# Patient Record
Sex: FEMALE | Race: WHITE | Employment: UNEMPLOYED | URBAN - NONMETROPOLITAN AREA
[De-identification: names, ages, dates, MRNs, and addresses within clinical notes are randomized per-mention and may not be internally consistent; named-entity substitution may affect disease eponyms.]

---

## 2018-07-10 ENCOUNTER — OFFICE VISIT (OUTPATIENT)
Dept: RHEUMATOLOGY | Age: 53
End: 2018-07-10
Payer: COMMERCIAL

## 2018-07-10 VITALS
DIASTOLIC BLOOD PRESSURE: 104 MMHG | HEART RATE: 111 BPM | HEIGHT: 62 IN | SYSTOLIC BLOOD PRESSURE: 172 MMHG | OXYGEN SATURATION: 98 % | WEIGHT: 204.4 LBS | BODY MASS INDEX: 37.61 KG/M2

## 2018-07-10 DIAGNOSIS — Z87.39 H/O RHEUMATOID ARTHRITIS: Primary | ICD-10-CM

## 2018-07-10 DIAGNOSIS — M25.50 POLYARTHRALGIA: ICD-10-CM

## 2018-07-10 DIAGNOSIS — R68.2 DRY MOUTH: ICD-10-CM

## 2018-07-10 DIAGNOSIS — M79.7 FIBROMYALGIA: ICD-10-CM

## 2018-07-10 DIAGNOSIS — R53.83 OTHER FATIGUE: ICD-10-CM

## 2018-07-10 PROCEDURE — 99244 OFF/OP CNSLTJ NEW/EST MOD 40: CPT | Performed by: INTERNAL MEDICINE

## 2018-07-10 RX ORDER — CYCLOBENZAPRINE HCL 5 MG
5 TABLET ORAL 2 TIMES DAILY PRN
Qty: 60 TABLET | Refills: 0 | Status: SHIPPED | OUTPATIENT
Start: 2018-07-10 | End: 2018-07-20

## 2018-07-10 ASSESSMENT — ENCOUNTER SYMPTOMS
HEARTBURN: 1
CONSTIPATION: 0
ABDOMINAL PAIN: 1
EYE DISCHARGE: 0
WHEEZING: 0
BLOOD IN STOOL: 1
DOUBLE VISION: 0
DIARRHEA: 0
PHOTOPHOBIA: 0
SHORTNESS OF BREATH: 0
BLURRED VISION: 0
COUGH: 0
STRIDOR: 0
EYE REDNESS: 1

## 2018-07-10 ASSESSMENT — JOINT PAIN
TOTAL NUMBER OF SWOLLEN JOINTS: 0
TOTAL NUMBER OF TENDER JOINTS: 6

## 2018-07-10 NOTE — PROGRESS NOTES
[address] [place] [address]  Rheumatology Adult Consult/Referral Note       Date: 7/10/2018  MRN: [id number]    Rheumatoid arthritis ([place])  - Fibromyalgia, osteoporosis, GERD,   - Wt gain for 70 lbs since 2010 with increased anxiety  - Tramadol, remicaide (uknown relief) , methotrexate (last Feb 2014), prednisone    HPI:   Sarthak Farah  is a(n)52 y.o. female with a hx of Fibromyalgia, osteoporosis, GERD, Rheumatoid arthritis (? 2012) , fatty liver, dry eyes,  Hypertension,  referred by IXANG Tate - CNP  for evaluation of her rheumatoid arthritis & fibromyalgia     Symptoms started: around 2006 with Right shoulder then progressed to intermittent episode of shoulder and wrist pain. (+) RF of 60.4 and ESR 25. Diagnosed with fibromyalgia by Dr. Meseret Almodovar at [facility]. Dx'ed with  rheumatoid arthritis around 2012 by Dr. Jessica Villa and treated with methotrexate, prednisone. Remicaide started in 2017 with no significant relief. Plaquenil (auditory changes). Lost to follow up around 2017. Last  Ra flare around 2017. Increased foot pain along the bilateral feet for the past month. Nodule along the Rt forearm since March 2018. The patient reports generalized myalgia and arthralgia. Joint pain in bilatearl fingers, wrists, shoulders, hips, knees, hankles, toes, mid back and neck. Most severe pain in the feet and shoulders. Pain on a scale 0-10: ranges from 5-9/10. Type of pain: constant myalgia in the arms, legs, and buttocks. Constant, variable. localized stiffness of the shoulder, hands, feet with occasional sharp pains. The other areas are intermittent and daily  Mid-back: intermittent, localized, Timing: mornings. Aggravating factors: cold, increased use of joints. Feet/ankles: prolonged standing. Mid back: lifting. Shoulders: use of arms, lifting, folding clothing. Alleviating factors: naproxen 440mg prn (moderate relief), prednisone (helps a lot of relief), tramadol (relief) .  Current therapy: Negative for environmental allergies. Does not bruise/bleed easily. Psychiatric/Behavioral: Negative for depression, hallucinations, substance abuse and suicidal ideas. The patient is nervous/anxious and has insomnia. PAST MEDICAL HISTORY  Past Medical History:   Diagnosis Date    Fatty liver 2018    Fibromyalgia     GERD (gastroesophageal reflux disease)     Osteoporosis        SOCIAL HISTORY  Social History     Social History    Marital status:      Spouse name: N/A    Number of children: N/A    Years of education: N/A     Social History Main Topics    Smoking status: Never Smoker    Smokeless tobacco: Never Used    Alcohol use No    Drug use: No    Sexual activity: Not Asked     Other Topics Concern    None     Social History Narrative    None       FAMILY HISTORY  No family history on file. SURGICAL HISTORY  Past Surgical History:   Procedure Laterality Date    CATARACT REMOVAL  2014     SECTION  [id number],[id number],8445    CHOLECYSTECTOMY  2016    COLONOSCOPY  2014    DILATION AND CURETTAGE OF UTERUS  1981    UPPER GASTROINTESTINAL ENDOSCOPY  2016       ALLERGIES  Allergies   Allergen Reactions    Sulfa Antibiotics        CURRENT MEDICATIONS  Current Outpatient Prescriptions   Medication Sig Dispense Refill    Naproxen Sodium (ALEVE) 220 MG CAPS Take 220 mg by mouth daily as needed for Pain      cycloSPORINE (RESTASIS) 0.05 % ophthalmic emulsion 1 drop 2 times daily       No current facility-administered medications for this visit. Objective:  BP (!) 172/104 (Site: Right Arm, Position: Sitting, Cuff Size: Medium Adult)   Pulse 111   Ht 5' 2\" (1.575 m)   Wt 204 lb 6.4 oz (92.7 kg)   SpO2 98%   BMI 37.39 kg/m²     General: No distress. Alert. Eyes: PERRL. No sclera icterus. No conjunctival injection. ENT: No discharge. Pharynx clear. Neck: Trachea midline. Normal thyroid. Resp: No accessory muscle use. No crackles. No wheezing. No rhonchi.  No dullness on CHELSI; Future  - Anti SSA; Future  - Anti SSB; Future  - Uric Acid; Future  - XR CHEST STANDARD (2 VW); Future    Sicca sx's:   - reported dx of Sjogren. ? Primary or secondary  - evaluation as outlined above. Fibromaylgia:   - Fibromyalgia is a non-life threatening non-rheumatic disease and is a diagnosis of exclusion. We Discussed diagnosis, pathophysiology and management options with the patient. - I have discussed with her the importance of aerobic, non impact activity - swimming, yoga, anil chi, walking or bicycle as well as healthy diet and sleep hygiene. A graded exercise program with physical therapy  - We discussed  possible massage therapy and acupuncture. - Various FDA approved treatment such as low dose TCAs, SNRI (cymbalta, savella), gapenetoids, (neurotin, lyrica), flexeril, physical therapy with graded exercise program, cognitive behavioral thearpy. NoN-fda approved medications (muscle relaxants, ssri's) with the patient. - Use of pain medications (opiods/narcotic) can sometimes cause hyperasthesia in patients with FMS and not part of the standard treatment guidelines. - start flexeril 5mg qhs to help with sleep. Discussed side effects such as fatigue, tiredness, avoid opporating heavy machinery until she knows how the medications affects here. Health maintenance:   - denies having pneumococcal or shingles vaccination  - request records of prior DXA from Dr. Seth Bentley. Blood BM's  - pt to follow up with PCP. - checking H/H today       Return in about 2 months (around 9/10/2018). Electronically signed by Eric Bills DO on 7/10/2018 at 2:28 PM      Thank you for allowing me to participate in the care of this patient. Please call if there are any questions.

## 2018-07-10 NOTE — LETTER
[address] [facility] [address]  Phone: [phone]  Fax: [phone]    [facility], APRN *        July 10, 2018       Patient: Enrique Kinney   MR Number: [id number]   YOB: 1965   Date of Visit: 7/10/2018       Dear Dr. Salena Nguyen:    Thank you for the request for consultation for Merla Pallas to me for the evaluation of her rheumatoid arthritis and fibromyalgia. Below are the relevant portions of my assessment and plan of care. If you have questions, please do not hesitate to call me. I look forward to following Flavia along with you.     Sincerely,        Natalie COHEN, DO    CC providers:  XIANG Tena - [place] [phone]  VIA Mail

## 2018-07-23 ENCOUNTER — TELEPHONE (OUTPATIENT)
Dept: RHEUMATOLOGY | Age: 53
End: 2018-07-23

## 2018-07-23 LAB
ALBUMIN SERPL-MCNC: 3.9 G/DL
ALP BLD-CCNC: 82 U/L
ALT SERPL-CCNC: 42 U/L
ANA TITER: NORMAL
ANION GAP SERPL CALCULATED.3IONS-SCNC: 13 MMOL/L
AST SERPL-CCNC: 32 U/L
BASOPHILS ABSOLUTE: 0.01 /ΜL
BASOPHILS RELATIVE PERCENT: 0.2 %
BILIRUB SERPL-MCNC: 0.3 MG/DL (ref 0.1–1.4)
BUN BLDV-MCNC: 11 MG/DL
C-REACTIVE PROTEIN: 4.9
CALCIUM SERPL-MCNC: 9 MG/DL
CHLORIDE BLD-SCNC: 105 MMOL/L
CO2: 28 MMOL/L
CREAT SERPL-MCNC: 0.7 MG/DL
EOSINOPHILS ABSOLUTE: 0.09 /ΜL
EOSINOPHILS RELATIVE PERCENT: 1.6 %
GFR CALCULATED: NORMAL
GLUCOSE BLD-MCNC: 99 MG/DL
HCT VFR BLD CALC: 38.5 % (ref 36–46)
HEMOGLOBIN: 12.7 G/DL (ref 12–16)
LYMPHOCYTES ABSOLUTE: 1.4 /ΜL
LYMPHOCYTES RELATIVE PERCENT: 24.2 %
MCH RBC QN AUTO: 28.4 PG
MCHC RBC AUTO-ENTMCNC: 33 G/DL
MCV RBC AUTO: 86.1 FL
MONOCYTES ABSOLUTE: 0.5 /ΜL
MONOCYTES RELATIVE PERCENT: 8.7 %
NEUTROPHILS ABSOLUTE: 3.78 /ΜL
NEUTROPHILS RELATIVE PERCENT: 65.3 %
PDW BLD-RTO: 42.2 %
PLATELET # BLD: 221 K/ΜL
PMV BLD AUTO: 11.1 FL
POTASSIUM SERPL-SCNC: 4.1 MMOL/L
RBC # BLD: 4.47 10^6/ΜL
RHEUMATOID FACTOR: 108.5
SEDIMENTATION RATE, ERYTHROCYTE: 22
SODIUM BLD-SCNC: 142 MMOL/L
TOTAL PROTEIN: 7.2
TSH SERPL DL<=0.05 MIU/L-ACNC: 1.15 UIU/ML
URIC ACID: 5.6
WBC # BLD: 5.78 10^3/ML

## 2018-07-23 NOTE — TELEPHONE ENCOUNTER
Pt called but there was no answer. A message was left on the patients voicemail asking them to call back. - cbc, cmp, TSH were stable. - Mild ESR & CRP    H/o rheumatoid arthritis:   - discussed possibly purusing arava if the labs support the diagnosis. - she reported understanding and had no further questions.

## 2018-07-25 ENCOUNTER — TELEPHONE (OUTPATIENT)
Dept: RHEUMATOLOGY | Age: 53
End: 2018-07-25

## 2018-07-25 DIAGNOSIS — M05.79 RHEUMATOID ARTHRITIS INVOLVING MULTIPLE SITES WITH POSITIVE RHEUMATOID FACTOR (HCC): ICD-10-CM

## 2018-07-25 DIAGNOSIS — Z51.81 MEDICATION MONITORING ENCOUNTER: Primary | ICD-10-CM

## 2018-07-25 RX ORDER — LEFLUNOMIDE 10 MG/1
10 TABLET ORAL DAILY
Qty: 30 TABLET | Refills: 0 | Status: SHIPPED | OUTPATIENT
Start: 2018-07-25 | End: 2018-09-19 | Stop reason: SDUPTHER

## 2018-09-12 ENCOUNTER — TELEPHONE (OUTPATIENT)
Dept: RHEUMATOLOGY | Age: 53
End: 2018-09-12

## 2018-09-19 LAB
ALBUMIN SERPL-MCNC: 4 G/DL
ALP BLD-CCNC: 95 U/L
ALT SERPL-CCNC: 43 U/L
ANION GAP SERPL CALCULATED.3IONS-SCNC: 12 MMOL/L
AST SERPL-CCNC: 32 U/L
BASOPHILS ABSOLUTE: 0.01 /ΜL
BASOPHILS RELATIVE PERCENT: 0.2 %
BILIRUB SERPL-MCNC: 0.5 MG/DL (ref 0.1–1.4)
BUN BLDV-MCNC: 14.3 MG/DL
C-REACTIVE PROTEIN: 3.7
CALCIUM SERPL-MCNC: 9.3 MG/DL
CHLORIDE BLD-SCNC: 106 MMOL/L
CO2: 31 MMOL/L
CREAT SERPL-MCNC: 0.7 MG/DL
EOSINOPHILS ABSOLUTE: 0.13 /ΜL
EOSINOPHILS RELATIVE PERCENT: 2.1 %
GFR CALCULATED: NORMAL
GLUCOSE BLD-MCNC: 94 MG/DL
HCT VFR BLD CALC: 40.8 % (ref 36–46)
HEMOGLOBIN: 13.4 G/DL (ref 12–16)
LYMPHOCYTES ABSOLUTE: 1.93 /ΜL
LYMPHOCYTES RELATIVE PERCENT: 31.1 %
MCH RBC QN AUTO: 28.4 PG
MCHC RBC AUTO-ENTMCNC: 32.8 G/DL
MCV RBC AUTO: 86.4 FL
MONOCYTES ABSOLUTE: 0.61 /ΜL
MONOCYTES RELATIVE PERCENT: 9.8 %
NEUTROPHILS ABSOLUTE: 3.52 /ΜL
NEUTROPHILS RELATIVE PERCENT: 56.6 %
PDW BLD-RTO: 43.2 %
PLATELET # BLD: 224 K/ΜL
PMV BLD AUTO: 10.9 FL
POTASSIUM SERPL-SCNC: 4.5 MMOL/L
RBC # BLD: 4.72 10^6/ΜL
SEDIMENTATION RATE, ERYTHROCYTE: 20
SODIUM BLD-SCNC: 144 MMOL/L
TOTAL PROTEIN: 7.4
WBC # BLD: 6.21 10^3/ML

## 2018-09-19 RX ORDER — LEFLUNOMIDE 20 MG/1
20 TABLET ORAL DAILY
Qty: 30 TABLET | Refills: 0 | Status: SHIPPED | OUTPATIENT
Start: 2018-09-19 | End: 2018-09-25 | Stop reason: ALTCHOICE

## 2018-09-20 ENCOUNTER — TELEPHONE (OUTPATIENT)
Dept: RHEUMATOLOGY | Age: 53
End: 2018-09-20

## 2018-09-21 NOTE — TELEPHONE ENCOUNTER
Rt side pain experienced with taking of the arava. Sx's resolved that discontinuation of the [address] - decreased arava to 5mg daily to see if the patient can tolerate the lower dosing.    - if unable to tolerate the arava other tx options include Ephriam Owenll, biologics.

## 2018-09-25 ENCOUNTER — OFFICE VISIT (OUTPATIENT)
Dept: RHEUMATOLOGY | Age: 53
End: 2018-09-25
Payer: COMMERCIAL

## 2018-09-25 VITALS
DIASTOLIC BLOOD PRESSURE: 81 MMHG | HEART RATE: 59 BPM | WEIGHT: 205.8 LBS | SYSTOLIC BLOOD PRESSURE: 138 MMHG | OXYGEN SATURATION: 100 % | BODY MASS INDEX: 37.64 KG/M2

## 2018-09-25 DIAGNOSIS — R68.2 DRY MOUTH: ICD-10-CM

## 2018-09-25 DIAGNOSIS — M05.79 RHEUMATOID ARTHRITIS INVOLVING MULTIPLE SITES WITH POSITIVE RHEUMATOID FACTOR (HCC): Primary | ICD-10-CM

## 2018-09-25 DIAGNOSIS — M79.7 FIBROMYALGIA: ICD-10-CM

## 2018-09-25 DIAGNOSIS — R20.2 PARESTHESIA OF BOTH HANDS: ICD-10-CM

## 2018-09-25 DIAGNOSIS — Z51.81 MEDICATION MONITORING ENCOUNTER: ICD-10-CM

## 2018-09-25 DIAGNOSIS — Z23 ENCOUNTER FOR VACCINATION: ICD-10-CM

## 2018-09-25 PROCEDURE — 99214 OFFICE O/P EST MOD 30 MIN: CPT | Performed by: INTERNAL MEDICINE

## 2018-09-25 PROCEDURE — 90670 PCV13 VACCINE IM: CPT | Performed by: INTERNAL MEDICINE

## 2018-09-25 PROCEDURE — 90471 IMMUNIZATION ADMIN: CPT | Performed by: INTERNAL MEDICINE

## 2018-09-25 RX ORDER — AZATHIOPRINE 50 MG/1
TABLET ORAL
Qty: 60 TABLET | Refills: 0 | Status: SHIPPED | OUTPATIENT
Start: 2018-09-25 | End: 2018-10-30 | Stop reason: SDUPTHER

## 2018-09-25 RX ORDER — CYCLOBENZAPRINE HCL 10 MG
10 TABLET ORAL 3 TIMES DAILY PRN
COMMUNITY
End: 2018-12-10

## 2018-09-25 ASSESSMENT — ENCOUNTER SYMPTOMS
WHEEZING: 0
DIARRHEA: 0
HEARTBURN: 1
PHOTOPHOBIA: 0
NAUSEA: 1
STRIDOR: 0
DOUBLE VISION: 0
BLURRED VISION: 0
CONSTIPATION: 0
COUGH: 0
BLOOD IN STOOL: 1
EYE DISCHARGE: 0
ABDOMINAL PAIN: 1
EYE REDNESS: 1
SHORTNESS OF BREATH: 0

## 2018-09-25 ASSESSMENT — JOINT PAIN
TOTAL NUMBER OF TENDER JOINTS: 5
TOTAL NUMBER OF SWOLLEN JOINTS: 3

## 2018-09-25 NOTE — PROGRESS NOTES
[facility]  Rheumatology Adult Follow up Note       Date: 9/25/2018  MRN: [id number]  -   HPI:   Gloria Randle  is a(n)52 y.o. female with a hx of Fibromyalgia, osteoporosis, GERD, Rheumatoid arthritis (+, CCP 24 ? 2012) , fatty liver, dry eyes,  Hypertension for the f/u evaluation of her rheumatoid arthritis & fibromyalgia     Symptoms started: around 2006 with Right shoulder then progressed to intermittent episode of shoulder and wrist pain. (+) RF of 60.4 and ESR 25. Diagnosed with fibromyalgia by Dr. Sharon Sanchez at [facility]. Dx'ed with  rheumatoid arthritis around 2012 by Dr. Seth Bentley and treated with methotrexate, prednisone. Remicaide started in 2017 with no significant relief. Plaquenil (auditory changes). Lost to follow up around 2017. Last  Ra flare around 2017. Increased foot pain along the bilateral feet for the past month. Nodule along the Rt forearm since March 2018. Started Arava 10mg daily on 7/25/18 - stopped med b/c of Right RUQ abd pain. Off medications for ~ 1 month. Continued  generalized myalgia and arthralgia. Joint pain in bilatearl fingers, wrists, b/l shoulders, left lower back, knees,hankles, toes, mid back and neck. Most severe pain in the Right hand arms, elbow and neck. Pain: 6/10 over the past wek. Type of pain: constant myalgia in the arms, legs, and buttocks. Constant, variable. localized stiffness of the shoulder, hands,  feet with occasional sharp pains. Timing: mornings. Aggravating factors: cold, increased use of joints. Feet/ankles: prolonged standing. Mid back: lifting, prolonged flexion of arms. Shoulders: use of arms, lifting, folding clothing. Alleviating factors: naproxen 440mg prn (moderate relief), prednisone (helps a lot of relief), tramadol (relief)     AM stiffness 1-2 hours. Denies joint swelling.   (+) dry eyes, dry mouth, vaginal dryness. Occasional eye redness.    Continued white-blue-red color changes clear.  Neck: Trachea midline. Normal thyroid. Resp: No accessory muscle use. No crackles. No wheezing. No rhonchi. No dullness on percussion. CV: Regular rate. Regular rhythm. No mumur or rub. No edema. GI: Non-tender. Non-distended. No masses. No organmegaly. Normal bowel sounds. M/S:   Upper extremities:  Muscle strength 5/5, FROM, bilateral fingers, wrists, elobws, shoulders. Shoulders: bilateral adolph, speed, push off. Left hawking and scarf.   elbows- tender bilateral lateral epicondyles. Right cubital tunnel tinel. Wrists: tinel right > left bilat. Fingers. - fullness of right 3rd PIP     Lower Extremities:   Muscle strength 5/5, FROM, No Synovitis of the hips, knees, ankles, toes. Knee: non-tender, no swelling. Ankles: medial ankle tenderness bilat. No synovitis, intact ROm. Feet: left > right MTP compression testing. Spine: tenderness of the cervical and thoracic perispinal musculature tenderness, (+) periscapular tenderness and hypertonicity. 14/18 FMS tender points. Neuro: DTR's 2/4 bilat and equal  Psych: Oriented to person, place, time. No anxiety or agitation. Skin: Warm and dry. No nodule on exposed extremities. No rash on exposed extremities. Lymph: No cervical LAD.  No supraclavicular LAD.      9/25/2018  RAPID3 :   3.7+6+5 = 14.7   RAPID3 Total Score: 14    Physical Exam     Tenderness:   Right sternoclavicular and left sternoclavicular  RUE: ulnohumeral and radiohumeral  Right hand: 1st MCP, 2nd MCP, 3rd PIP and 4th PIP  RLE: tibiotalar  LLE: tibiotalar  Right foot: 1st MTP, 2nd MTP, 3rd MTP, 4th MTP and 5th MTP  Left foot: 1st MTP, 2nd MTP, 3rd MTP, 4th MTP and 5th MTP    Swelling:   Right sternoclavicular and left sternoclavicular  Right hand: 2nd MCP, 3rd PIP and 4th PIP    BROWNING-28 tender joint count: 5  BROWNING-28 swollen joint count: 3    Remission: <3  Low Disease Activity: <6  Moderate Disease Activity: >=6 and <=12  High Disease Activity: >12    LABS:  CBC  Lab upper quat abd pain)     - start imuran 50mg qd x 7 days then 50mg bid given active RA   - - We discussed risks of Azathioprine including liver infection, cancer, blood dyscrasias. she was specifically instructed to avoid ETOH use. she was instructed to stop Azathiprine at the onset of any infection and  instructed to call me if concern for infection. Sicca sx's:   - reported dx of Sjogren secondary to RA   - evaluation as outlined above. Fibromaylgia:   - Fibromyalgia is a non-life threatening non-rheumatic disease and is a diagnosis of exclusion. We Discussed diagnosis, pathophysiology and management options with the patient. - I have discussed with her the importance of aerobic, non impact activity - swimming, yoga, anil chi, walking or bicycle as well as healthy diet and sleep hygiene. A graded exercise program with physical therapy  - We discussed  possible massage therapy and acupuncture. - Various FDA approved treatment such as low dose TCAs, SNRI (cymbalta, savella), gapenetoids, (neurotin, lyrica), flexeril, physical therapy with graded exercise program, cognitive behavioral thearpy. NoN-fda approved medications (muscle relaxants, ssri's) with the patient. - Use of pain medications (opiods/narcotic) can sometimes cause hyperasthesia in patients with FMS and not part of the standard treatment guidelines. -  Decrease flexeril 2.5mg qhs to help with sleep. --     Right > left arm paresthesias:   - + tinel bilatearl wrists, and right elbow. - ? If related to RA vs compressive neuropathy  - start new medication for RA and re-evaluate at next visit. - discussed wrist splinting nightly. Health maintenance:   - denies having pneumococcal or shingles vaccination  - request records of prior DXA from Dr. Sandra Valencia. Med monitoring:   - labs (CBC, CMP, ESR, CRP)  q4 weeks given AZA initiation     Blood BM's  - pt to follow up with PCP.    - no recurrence since the last evaluation, felt likely to be from hemorrhoids. No Follow-up on file. Electronically signed by Jose Coto DO on 9/25/2018 at 8:34 AM      Thank you for allowing me to participate in the care of this patient. Please call if there are any questions.

## 2018-10-30 DIAGNOSIS — Z51.81 MEDICATION MONITORING ENCOUNTER: ICD-10-CM

## 2018-10-30 DIAGNOSIS — M05.79 RHEUMATOID ARTHRITIS INVOLVING MULTIPLE SITES WITH POSITIVE RHEUMATOID FACTOR (HCC): ICD-10-CM

## 2018-10-30 LAB
ALBUMIN SERPL-MCNC: 3.6 G/DL
ALP BLD-CCNC: 93 U/L
ALT SERPL-CCNC: 36 U/L
ANION GAP SERPL CALCULATED.3IONS-SCNC: 10 MMOL/L
AST SERPL-CCNC: 26 U/L
BASOPHILS ABSOLUTE: 0.01 /ΜL
BASOPHILS RELATIVE PERCENT: 0.2 %
BILIRUB SERPL-MCNC: 0.5 MG/DL (ref 0.1–1.4)
BUN BLDV-MCNC: 20 MG/DL
C-REACTIVE PROTEIN: 4.3
CALCIUM SERPL-MCNC: 9.2 MG/DL
CHLORIDE BLD-SCNC: 106 MMOL/L
CO2: 31 MMOL/L
CREAT SERPL-MCNC: 0.7 MG/DL
EOSINOPHILS ABSOLUTE: 0.01 /ΜL
EOSINOPHILS RELATIVE PERCENT: 2.5 %
GFR CALCULATED: 93
GLUCOSE BLD-MCNC: 92 MG/DL
HCT VFR BLD CALC: 38.9 % (ref 36–46)
HEMOGLOBIN: 12.6 G/DL (ref 12–16)
LYMPHOCYTES ABSOLUTE: 1.88 /ΜL
LYMPHOCYTES RELATIVE PERCENT: 33.2 %
MCH RBC QN AUTO: 28.2 PG
MCHC RBC AUTO-ENTMCNC: 32.4 G/DL
MCV RBC AUTO: 87 FL
MONOCYTES ABSOLUTE: 0.59 /ΜL
MONOCYTES RELATIVE PERCENT: 10.4 %
NEUTROPHILS ABSOLUTE: 3.04 /ΜL
NEUTROPHILS RELATIVE PERCENT: 53.7 %
PDW BLD-RTO: 43.8 %
PLATELET # BLD: 225 K/ΜL
PMV BLD AUTO: 10.8 FL
POTASSIUM SERPL-SCNC: 4.1 MMOL/L
RBC # BLD: 4.47 10^6/ΜL
SEDIMENTATION RATE, ERYTHROCYTE: 18
SODIUM BLD-SCNC: 143 MMOL/L
TOTAL PROTEIN: 7.2
WBC # BLD: 5.66 10^3/ML

## 2018-10-30 RX ORDER — AZATHIOPRINE 50 MG/1
50 TABLET ORAL 2 TIMES DAILY
Qty: 60 TABLET | Refills: 0 | Status: SHIPPED | OUTPATIENT
Start: 2018-10-30 | End: 2018-12-10 | Stop reason: SDUPTHER

## 2018-11-02 ENCOUNTER — TELEPHONE (OUTPATIENT)
Dept: RHEUMATOLOGY | Age: 53
End: 2018-11-02

## 2018-11-02 NOTE — TELEPHONE ENCOUNTER
Called patient, no answer. A message was left on patients voicemail asking them to give the office a call back to discuss lab results.

## 2018-11-02 NOTE — TELEPHONE ENCOUNTER
----- Message from Fatimah Goldberg DO sent at 10/30/2018  6:44 PM EDT -----  Please inform the patient that her labs are stable.   Please ask if she is tolerating the new prescription of azathioprine and if and where she is currently having joint pains

## 2018-12-07 LAB
ALBUMIN SERPL-MCNC: 3.9 G/DL
ALP BLD-CCNC: 72 U/L
ALT SERPL-CCNC: 30 U/L
ANION GAP SERPL CALCULATED.3IONS-SCNC: 14 MMOL/L
AST SERPL-CCNC: 19 U/L
BASOPHILS ABSOLUTE: 0.01 /ΜL
BASOPHILS RELATIVE PERCENT: 0.2 %
BILIRUB SERPL-MCNC: 0.4 MG/DL (ref 0.1–1.4)
BUN BLDV-MCNC: 13 MG/DL
C-REACTIVE PROTEIN: 3.9
CALCIUM SERPL-MCNC: 9.2 MG/DL
CHLORIDE BLD-SCNC: 107 MMOL/L
CO2: 26 MMOL/L
CREAT SERPL-MCNC: 0.8 MG/DL
EOSINOPHILS ABSOLUTE: 0.11 /ΜL
EOSINOPHILS RELATIVE PERCENT: 1.7 %
GFR CALCULATED: NORMAL
GLUCOSE BLD-MCNC: 95 MG/DL
HCT VFR BLD CALC: 39.3 % (ref 36–46)
HEMOGLOBIN: 12.8 G/DL (ref 12–16)
LYMPHOCYTES ABSOLUTE: 2.03 /ΜL
LYMPHOCYTES RELATIVE PERCENT: 31.9 %
MCH RBC QN AUTO: 28.3 PG
MCHC RBC AUTO-ENTMCNC: 32.6 G/DL
MCV RBC AUTO: 86.9 FL
MONOCYTES ABSOLUTE: 0.58 /ΜL
MONOCYTES RELATIVE PERCENT: 9.1 %
NEUTROPHILS ABSOLUTE: 3.63 /ΜL
NEUTROPHILS RELATIVE PERCENT: 56.9 %
PDW BLD-RTO: 43.7 %
PLATELET # BLD: 229 K/ΜL
PMV BLD AUTO: 11.2 FL
POTASSIUM SERPL-SCNC: 4.2 MMOL/L
RBC # BLD: 4.52 10^6/ΜL
SEDIMENTATION RATE, ERYTHROCYTE: 19
SODIUM BLD-SCNC: 143 MMOL/L
TOTAL PROTEIN: 7.1
WBC # BLD: 6.37 10^3/ML

## 2018-12-10 ENCOUNTER — OFFICE VISIT (OUTPATIENT)
Dept: RHEUMATOLOGY | Age: 53
End: 2018-12-10
Payer: COMMERCIAL

## 2018-12-10 VITALS
BODY MASS INDEX: 37.28 KG/M2 | SYSTOLIC BLOOD PRESSURE: 150 MMHG | OXYGEN SATURATION: 100 % | WEIGHT: 203.8 LBS | DIASTOLIC BLOOD PRESSURE: 80 MMHG | HEART RATE: 62 BPM

## 2018-12-10 DIAGNOSIS — M05.79 RHEUMATOID ARTHRITIS INVOLVING MULTIPLE SITES WITH POSITIVE RHEUMATOID FACTOR (HCC): Primary | ICD-10-CM

## 2018-12-10 DIAGNOSIS — R68.2 DRY MOUTH: ICD-10-CM

## 2018-12-10 DIAGNOSIS — M79.7 FIBROMYALGIA: ICD-10-CM

## 2018-12-10 DIAGNOSIS — Z51.81 MEDICATION MONITORING ENCOUNTER: ICD-10-CM

## 2018-12-10 DIAGNOSIS — J34.89 NASAL SORE: ICD-10-CM

## 2018-12-10 PROCEDURE — 99214 OFFICE O/P EST MOD 30 MIN: CPT | Performed by: INTERNAL MEDICINE

## 2018-12-10 RX ORDER — PREDNISONE 1 MG/1
5 TABLET ORAL DAILY
Qty: 10 TABLET | Refills: 0 | Status: SHIPPED | OUTPATIENT
Start: 2018-12-10 | End: 2018-12-20

## 2018-12-10 RX ORDER — MINOCYCLINE HYDROCHLORIDE 100 MG/1
100 TABLET ORAL 2 TIMES DAILY
Qty: 60 TABLET | Refills: 0 | Status: SHIPPED | OUTPATIENT
Start: 2018-12-10 | End: 2019-01-07 | Stop reason: SDUPTHER

## 2018-12-10 ASSESSMENT — ENCOUNTER SYMPTOMS
CONSTIPATION: 0
EYE REDNESS: 1
HEARTBURN: 1
BLURRED VISION: 0
PHOTOPHOBIA: 0
SHORTNESS OF BREATH: 0
BLOOD IN STOOL: 1
STRIDOR: 0
EYE DISCHARGE: 0
WHEEZING: 0
ABDOMINAL PAIN: 1
COUGH: 0
DIARRHEA: 0
DOUBLE VISION: 0
NAUSEA: 1

## 2018-12-10 NOTE — PROGRESS NOTES
prn (moderate relief), prednisone (helps a lot of relief), tramadol (relief)     - AM stiffness improved to 30 minutes. - Denies joint swelling, redness,warmth   - Dry eyes, dry mouth, vaginal dryness. Occasional eye redness.   - Continued white-blue-red color changes of fingers and hands with cold exposure. Denies digital ulcerations, skin tightening  - instabilty of ankles with occasional weakness sensation. Current therapy:    - naproxen. Previous therapy: prior treatment as above. Gabapentin (improved sleep), ARava 10mg (RUQ abd pain), MTX, Imuran (sept 2018-- RUQ pain)     Cancer screening: up to date   Paternal uncle : hemachromatosis. Mother: lupus/MCTD, Paternal & maternal cousin, daughter: Rheumatoid arthritis     ROS:  Review of Systems   Constitutional: Negative for malaise/fatigue and weight loss. HENT: Negative for congestion, ear discharge, hearing loss and tinnitus. Eyes: Positive for redness. Negative for blurred vision, double vision, photophobia and discharge. Respiratory: Negative for cough, shortness of breath, wheezing and stridor. Cardiovascular: Positive for leg swelling (occasional dependent). Negative for chest pain and palpitations. Gastrointestinal: Positive for abdominal pain, blood in stool, heartburn and nausea. Negative for constipation, diarrhea and melena. Genitourinary: Positive for frequency. Negative for dysuria, flank pain, hematuria and urgency. Musculoskeletal: Positive for myalgias and neck pain. Skin: Negative for rash. Neurological: Positive for dizziness, tingling (occasionally in feet, arms (bicep regions)), weakness (upper arms bilat, and decreased  strenght (knobs, jars) ) and headaches. Endo/Heme/Allergies: Negative for environmental allergies. Bruises/bleeds easily. Psychiatric/Behavioral: Negative for depression, hallucinations, substance abuse and suicidal ideas. The patient is nervous/anxious and has insomnia.         PAST

## 2019-01-04 LAB
ALBUMIN SERPL-MCNC: 3.5 G/DL
ALP BLD-CCNC: 102 U/L
ALT SERPL-CCNC: 41 U/L
ANION GAP SERPL CALCULATED.3IONS-SCNC: 15 MMOL/L
AST SERPL-CCNC: 26 U/L
BASOPHILS ABSOLUTE: 0.01 /ΜL
BASOPHILS RELATIVE PERCENT: 0.2 %
BILIRUB SERPL-MCNC: 0.4 MG/DL (ref 0.1–1.4)
BUN BLDV-MCNC: 16 MG/DL
C-REACTIVE PROTEIN: 6.6
CALCIUM SERPL-MCNC: 9.1 MG/DL
CHLORIDE BLD-SCNC: 105 MMOL/L
CO2: 25 MMOL/L
CREAT SERPL-MCNC: 0.7 MG/DL
EOSINOPHILS ABSOLUTE: 0.27 /ΜL
EOSINOPHILS RELATIVE PERCENT: 4.6 %
GFR CALCULATED: NORMAL
GLUCOSE BLD-MCNC: 96 MG/DL
HCT VFR BLD CALC: 39.8 % (ref 36–46)
HEMOGLOBIN: 12.9 G/DL (ref 12–16)
LYMPHOCYTES ABSOLUTE: 1.57 /ΜL
LYMPHOCYTES RELATIVE PERCENT: 26.8 %
MCH RBC QN AUTO: 27.9 PG
MCHC RBC AUTO-ENTMCNC: 32.4 G/DL
MCV RBC AUTO: 86.1 FL
MONOCYTES ABSOLUTE: 0.6 /ΜL
MONOCYTES RELATIVE PERCENT: 10.2 %
NEUTROPHILS ABSOLUTE: 3.39 /ΜL
NEUTROPHILS RELATIVE PERCENT: 57.9 %
PDW BLD-RTO: 42.6 %
PLATELET # BLD: 197 K/ΜL
PMV BLD AUTO: 10.8 FL
POTASSIUM SERPL-SCNC: 4.2 MMOL/L
RBC # BLD: 4.62 10^6/ΜL
SEDIMENTATION RATE, ERYTHROCYTE: 17
SODIUM BLD-SCNC: 141 MMOL/L
TOTAL PROTEIN: 7.1
WBC # BLD: 5.86 10^3/ML

## 2019-01-07 DIAGNOSIS — M05.79 RHEUMATOID ARTHRITIS INVOLVING MULTIPLE SITES WITH POSITIVE RHEUMATOID FACTOR (HCC): ICD-10-CM

## 2019-01-07 RX ORDER — MINOCYCLINE HYDROCHLORIDE 100 MG/1
100 TABLET ORAL 2 TIMES DAILY
Qty: 60 TABLET | Refills: 0 | Status: SHIPPED | OUTPATIENT
Start: 2019-01-07 | End: 2019-02-12 | Stop reason: SDUPTHER

## 2019-02-11 LAB
ALBUMIN SERPL-MCNC: 3.2 G/DL
ALP BLD-CCNC: 92 U/L
ALT SERPL-CCNC: 27 U/L
ANION GAP SERPL CALCULATED.3IONS-SCNC: 11 MMOL/L
AST SERPL-CCNC: 22 U/L
BASOPHILS ABSOLUTE: 0 /ΜL
BASOPHILS RELATIVE PERCENT: 0 %
BILIRUB SERPL-MCNC: 0.4 MG/DL (ref 0.1–1.4)
BUN BLDV-MCNC: 18 MG/DL
C-REACTIVE PROTEIN: 3.3
CALCIUM SERPL-MCNC: 9 MG/DL
CHLORIDE BLD-SCNC: 106 MMOL/L
CO2: 30 MMOL/L
CREAT SERPL-MCNC: 0.8 MG/DL
EOSINOPHILS ABSOLUTE: 0.15 /ΜL
EOSINOPHILS RELATIVE PERCENT: 2.8 %
GFR CALCULATED: 79
GLUCOSE BLD-MCNC: 97 MG/DL
HCT VFR BLD CALC: 37.2 % (ref 36–46)
HEMOGLOBIN: 11.8 G/DL (ref 12–16)
LYMPHOCYTES ABSOLUTE: 1.64 /ΜL
LYMPHOCYTES RELATIVE PERCENT: 31.1 %
MCH RBC QN AUTO: 27.8 PG
MCHC RBC AUTO-ENTMCNC: 31.7 G/DL
MCV RBC AUTO: 87.7 FL
MONOCYTES ABSOLUTE: 0.58 /ΜL
MONOCYTES RELATIVE PERCENT: 11 %
NEUTROPHILS ABSOLUTE: 2.91 /ΜL
NEUTROPHILS RELATIVE PERCENT: 55.1 %
PDW BLD-RTO: 44.6 %
PLATELET # BLD: 189 K/ΜL
PMV BLD AUTO: 11.1 FL
POTASSIUM SERPL-SCNC: 4.3 MMOL/L
RBC # BLD: 4.24 10^6/ΜL
SEDIMENTATION RATE, ERYTHROCYTE: 13
SODIUM BLD-SCNC: 143 MMOL/L
TOTAL PROTEIN: 6.5
WBC # BLD: 5.28 10^3/ML

## 2019-02-12 DIAGNOSIS — M05.79 RHEUMATOID ARTHRITIS INVOLVING MULTIPLE SITES WITH POSITIVE RHEUMATOID FACTOR (HCC): ICD-10-CM

## 2019-02-12 RX ORDER — MINOCYCLINE HYDROCHLORIDE 100 MG/1
100 TABLET ORAL 2 TIMES DAILY
Qty: 60 TABLET | Refills: 3 | Status: SHIPPED | OUTPATIENT
Start: 2019-02-12 | End: 2019-06-19 | Stop reason: SDUPTHER

## 2019-02-12 RX ORDER — PREDNISONE 1 MG/1
5 TABLET ORAL DAILY
Qty: 30 TABLET | Refills: 1 | Status: SHIPPED | OUTPATIENT
Start: 2019-02-12 | End: 2019-02-13 | Stop reason: SDUPTHER

## 2019-02-13 ENCOUNTER — OFFICE VISIT (OUTPATIENT)
Dept: RHEUMATOLOGY | Age: 54
End: 2019-02-13
Payer: COMMERCIAL

## 2019-02-13 VITALS
SYSTOLIC BLOOD PRESSURE: 126 MMHG | HEIGHT: 62 IN | BODY MASS INDEX: 37.8 KG/M2 | OXYGEN SATURATION: 99 % | WEIGHT: 205.4 LBS | DIASTOLIC BLOOD PRESSURE: 84 MMHG | HEART RATE: 73 BPM

## 2019-02-13 DIAGNOSIS — M79.7 FIBROMYALGIA: ICD-10-CM

## 2019-02-13 DIAGNOSIS — Z23 ENCOUNTER FOR VACCINATION: ICD-10-CM

## 2019-02-13 DIAGNOSIS — R68.2 DRY MOUTH: ICD-10-CM

## 2019-02-13 DIAGNOSIS — M05.79 RHEUMATOID ARTHRITIS INVOLVING MULTIPLE SITES WITH POSITIVE RHEUMATOID FACTOR (HCC): Primary | ICD-10-CM

## 2019-02-13 DIAGNOSIS — J34.89 NASAL SORE: ICD-10-CM

## 2019-02-13 DIAGNOSIS — Z51.81 MEDICATION MONITORING ENCOUNTER: ICD-10-CM

## 2019-02-13 DIAGNOSIS — R20.2 PARESTHESIA OF BOTH HANDS: ICD-10-CM

## 2019-02-13 PROCEDURE — 99214 OFFICE O/P EST MOD 30 MIN: CPT | Performed by: INTERNAL MEDICINE

## 2019-02-13 PROCEDURE — 90732 PPSV23 VACC 2 YRS+ SUBQ/IM: CPT | Performed by: INTERNAL MEDICINE

## 2019-02-13 PROCEDURE — 90471 IMMUNIZATION ADMIN: CPT | Performed by: INTERNAL MEDICINE

## 2019-02-13 RX ORDER — PREDNISONE 1 MG/1
2.5 TABLET ORAL DAILY
Qty: 30 TABLET | Refills: 1
Start: 2019-02-13 | End: 2019-04-22 | Stop reason: SDUPTHER

## 2019-02-13 ASSESSMENT — ENCOUNTER SYMPTOMS
EYES NEGATIVE: 1
EYE REDNESS: 0
BLOOD IN STOOL: 0
COUGH: 0
ABDOMINAL PAIN: 0
SHORTNESS OF BREATH: 0
COLOR CHANGE: 0
EYE PAIN: 0
NAUSEA: 1
VOMITING: 0
BACK PAIN: 1
WHEEZING: 0
DIARRHEA: 0
RESPIRATORY NEGATIVE: 1
CONSTIPATION: 1

## 2019-04-19 LAB
C-REACTIVE PROTEIN: 0.8
SEDIMENTATION RATE, ERYTHROCYTE: 15

## 2019-04-22 ENCOUNTER — OFFICE VISIT (OUTPATIENT)
Dept: RHEUMATOLOGY | Age: 54
End: 2019-04-22
Payer: COMMERCIAL

## 2019-04-22 VITALS
WEIGHT: 202.6 LBS | OXYGEN SATURATION: 98 % | HEIGHT: 62 IN | DIASTOLIC BLOOD PRESSURE: 82 MMHG | HEART RATE: 87 BPM | BODY MASS INDEX: 37.28 KG/M2 | SYSTOLIC BLOOD PRESSURE: 124 MMHG

## 2019-04-22 DIAGNOSIS — Z51.81 MEDICATION MONITORING ENCOUNTER: ICD-10-CM

## 2019-04-22 DIAGNOSIS — R20.2 PARESTHESIA OF BOTH HANDS: ICD-10-CM

## 2019-04-22 DIAGNOSIS — Z23 ENCOUNTER FOR VACCINATION: ICD-10-CM

## 2019-04-22 DIAGNOSIS — M05.79 RHEUMATOID ARTHRITIS INVOLVING MULTIPLE SITES WITH POSITIVE RHEUMATOID FACTOR (HCC): Primary | ICD-10-CM

## 2019-04-22 PROCEDURE — 99214 OFFICE O/P EST MOD 30 MIN: CPT | Performed by: INTERNAL MEDICINE

## 2019-04-22 RX ORDER — MINOCYCLINE HYDROCHLORIDE 100 MG/1
TABLET ORAL
Refills: 3 | COMMUNITY
Start: 2019-04-07 | End: 2019-06-20 | Stop reason: SDUPTHER

## 2019-04-22 RX ORDER — PREDNISONE 2.5 MG
1.25 TABLET ORAL DAILY
Qty: 30 TABLET | Refills: 3 | Status: SHIPPED | OUTPATIENT
Start: 2019-04-22 | End: 2019-10-16 | Stop reason: SDUPTHER

## 2019-04-22 ASSESSMENT — ENCOUNTER SYMPTOMS
COLOR CHANGE: 0
EYE REDNESS: 0
BLOOD IN STOOL: 0
ABDOMINAL PAIN: 0
EYE PAIN: 0
BACK PAIN: 1
SHORTNESS OF BREATH: 0
VOMITING: 0
NAUSEA: 1
WHEEZING: 0
EYES NEGATIVE: 1
DIARRHEA: 0
RESPIRATORY NEGATIVE: 1
COUGH: 0
CONSTIPATION: 1

## 2019-04-22 ASSESSMENT — JOINT PAIN
TOTAL NUMBER OF SWOLLEN JOINTS: 1
TOTAL NUMBER OF TENDER JOINTS: 5

## 2019-04-22 NOTE — PROGRESS NOTES
[facility] RHEUMATOLOGY FOLLOW UP NOTE       Date Of Service: 4/22/2019  Provider: Zayra Noonan DO    Name: Bhargavi Mejia   MRN: [id number]    Chief Complaint(s)     Chief Complaint   Patient presents with    Follow-up     Rheumatoid Arthritis         History of Present Illness (HPI)     Bhargavi Mejia  is a(n)53 y.o. female with a hx of Fibromyalgia, osteoporosis, GERD, Rheumatoid arthritis (+, CCP 24) 2012) , h/o hemorrhoids,  fatty liver, dry eyes,  Hypertension for the f/u evaluation of her rheumatoid arthritis & fibromyalgia      Symptoms started: around 2006 with Right shoulder then progressed to intermittent episode of shoulder and wrist pain. (+) RF of 60.4 and ESR 25. Diagnosed with fibromyalgia by Dr. Faizan Schneider at [facility]. Dx'ed with  rheumatoid arthritis around 2012 by Dr. Marely Sewell and treated with methotrexate, prednisone. Remicaide started in 2017 with no significant relief. Plaquenil (auditory changes). Lost to follow up around 2017. Last  Ra flare around 2017. Increased foot pain along the bilateral feet for the past month. Nodule along the Rt forearm since March 2018.      Interval   - taking minocycline 100mg daily bid, prednisone 2.5mg daily     Arthralgia in the left shoulder, bilateral hips, left > right foot, neck. Pain 6/10 over the past week . Neck = constant. Denies radicaular sx's, numbness. Timing: mornings. Aggravating factors: cold, increased use of joints. Feet/ankles: prolonged standing. Mid back: lifting, prolonged flexion of arms. Shoulders: use of arms, lifting, folding clothing. Alleviating factors: naproxen 440mg prn (moderate relief), prednisone (helps a lot of relief), tramadol (relief)     + joint swelling continued in feet (left > right)   AM stiffness 15-30 minutes. Feet with occasional sensation of insect crawling on her. Mainly with driving.    Myalgia mainly in the lower legs intermittently occurring.  (left calf) x-ray and U/S eval. Since the last UTERUS  1981    UPPER GASTROINTESTINAL ENDOSCOPY  2016       FAMILY HISTORY      Family History   Problem Relation Age of Onset    Lupus Mother     Arthritis Mother         MCTD / lupus    Rheum Arthritis Maternal Cousin     Rheum Arthritis Paternal Cousin     Rheum Arthritis Daughter        SOCIAL HISTORY      Social History     Tobacco History     Smoking Status  Never Smoker    Smokeless Tobacco Use  Never Used          Alcohol History     Alcohol Use Status  No          Drug Use     Drug Use Status  No          Sexual Activity     Sexually Active  Not Asked                ALLERGIES     Allergies   Allergen Reactions    Sulfa Antibiotics Nausea And Vomiting       CURRENT MEDICATIONS      Current Outpatient Medications   Medication Sig Dispense Refill    minocycline (DYNACIN) 100 MG tablet TAKE 1 TABLET BY MOUTH TWICE A DAY  3    predniSONE (DELTASONE) 5 MG tablet Take 0.5 tablets by mouth daily 30 tablet 1    Naproxen Sodium (ALEVE) 220 MG CAPS Take 220 mg by mouth daily as needed for Pain      cycloSPORINE (RESTASIS) 0.05 % ophthalmic emulsion 1 drop 2 times daily       No current facility-administered medications for this visit. PHYSICAL EXAMINATION / OBJECTIVE     Objective:  /82 (Site: Left Upper Arm, Position: Sitting, Cuff Size: Large Adult)   Pulse 87   Ht 5' 2.01\" (1.575 m)   Wt 202 lb 9.6 oz (91.9 kg)   SpO2 98%   BMI 37.05 kg/m²     Physical Exam   Constitutional: She is oriented to person, place, and time. She appears well-developed and well-nourished. HENT:   Head: Normocephalic. Eyes: Pupils are equal, round, and reactive to light. EOM are normal.   Neck: Normal range of motion. Neck supple. Cardiovascular: Normal rate, regular rhythm and normal heart sounds. bilat lower extremity pitting edema   Pulmonary/Chest: Effort normal and breath sounds normal. No respiratory distress. She has no wheezes. She has no rales.    Lymphadenopathy:     She has no cervical Value Date    CALCIUM 9.0 02/11/2019    LABALBU 3.2 02/11/2019     02/11/2019    K 4.3 02/11/2019    CO2 30 02/11/2019     02/11/2019    BUN 18 02/11/2019    CREATININE 0.8 02/11/2019    ALKPHOS 92 02/11/2019    ALT 27 02/11/2019    AST 22 02/11/2019       HgBA1c: No components found for: HGBA1C    Lab Results   Component Value Date    TSH 1.149 07/23/2018     No results found for: VITD25      No results found for: ANASCRN  No results found for: SSA  No results found for: SSB  No results found for: ANTI-SMITH  No results found for: DSDNAAB   No results found for: ANTIRNP  No results found for: C3, C4  No results found for: CCPAB  Lab Results   Component Value Date    .5 07/23/2018       No components found for: CANCASCRN, APANCASCRN  Lab Results   Component Value Date    SEDRATE 13 02/11/2019     Lab Results   Component Value Date    CRP 3.3 02/11/2019       RADIOLOGY:         ASSESSMENT/PLAN    Assessment   Plan     Rheumatoid arthritis: +  RF, + CCP   Diagnosed with fibromyalgia by Dr. Sebas Sierra at [facility]. Dx'ed with  rheumatoid arthritis around 2012 by Dr. Jose Carranza and treated with methotrexate, prednisone. Remicaide started in 2017 with no significant relief. Plaquenil (auditory changes). Lost to follow up around 2017. Last  Ra flare around 2017. Increased foot pain along the bilateral feet for the past month. Nodule along the Rt forearm since March 2018.   Fatigue, tiredness, and GI upset improved since stopping the methotrexate in Feb 2018.                - extensive family hx of inflammatory arthritis -- mother w/ lupus                - Prior tx: methotrexate (fatigue, Gi upset), Remicaide (? Relief), prednisone (a lot of relief) , plaquenil (auditory changes) , ARava 10mg qd - July 2018 - Aug 2018 ( Right upper quat abd pain) , Imuran 50mg bid sept 2018 --- (RUQ pain)                   -  wean prednisone 1.25 mg daily per pt request.                -  Minocycline 100mg bid (off label use for RA)                 -  TB testing prior to starting anti-TNF therapy    -  Pt okay with pursuit of enbrel 50mg subcu weekly. TNF INHIBITORS can cause increased risk of oppurtunistic infections, lupus like illness, demyelinating disease and possible risk of malignancies and were explained to the patient     Sicca sx's:   - reported dx of Sjogren secondary to RA   - evaluation as outlined above. Fibromaylgia:                -  prior tx:  flexeril 2.5mg qhs, gabapentin   - discussed trial of OTC magnesium supplementation.      Right > left arm paresthesias:  Active - stable. - + tinel bilatearl wrists, and right elbow. - discussed wrist splinting nightly.      Health maintenance:   - prevnar 13 -- 9/25/18  - Pneumovax 23 -- 2/13/2019      Med monitoring:   - labs (CBC, CMP, ESR, CRP)  q8 weeks given minocycline      Bruising --   - can be medication related. No follow-ups on file. Electronically signed by Sheree Becerra DO on 4/22/2019 at 2:56 PM    New Prescriptions    No medications on file       Thank you for allowing me to participate in the care of this patient. Please call if there are any questions.

## 2019-04-25 DIAGNOSIS — M05.79 RHEUMATOID ARTHRITIS INVOLVING MULTIPLE SITES WITH POSITIVE RHEUMATOID FACTOR (HCC): Primary | ICD-10-CM

## 2019-05-29 ENCOUNTER — TELEPHONE (OUTPATIENT)
Dept: RHEUMATOLOGY | Age: 54
End: 2019-05-29

## 2019-05-29 NOTE — TELEPHONE ENCOUNTER
Patient calling with questions regarding using her Enbrel along with her prednisone and minocycline. I reviewed your last note with her but she wants to know specifically if you want her on all 3 medications. She states that she wasn't sure what she wanted to do when she saw you in April so she wants clarification.

## 2019-05-29 NOTE — TELEPHONE ENCOUNTER
Phoned patient and reviewed. She is here to  her Enbrel auto injector.  Education given and patient voiced understanding

## 2019-06-19 DIAGNOSIS — M05.79 RHEUMATOID ARTHRITIS INVOLVING MULTIPLE SITES WITH POSITIVE RHEUMATOID FACTOR (HCC): ICD-10-CM

## 2019-06-20 RX ORDER — MINOCYCLINE HYDROCHLORIDE 100 MG/1
TABLET ORAL
Qty: 60 TABLET | Refills: 3 | Status: SHIPPED | OUTPATIENT
Start: 2019-06-20 | End: 2020-01-20 | Stop reason: SDUPTHER

## 2019-08-22 ENCOUNTER — OFFICE VISIT (OUTPATIENT)
Dept: RHEUMATOLOGY | Age: 54
End: 2019-08-22
Payer: COMMERCIAL

## 2019-08-22 VITALS
OXYGEN SATURATION: 98 % | HEIGHT: 62 IN | SYSTOLIC BLOOD PRESSURE: 142 MMHG | BODY MASS INDEX: 38.28 KG/M2 | WEIGHT: 208 LBS | HEART RATE: 83 BPM | DIASTOLIC BLOOD PRESSURE: 98 MMHG

## 2019-08-22 DIAGNOSIS — M54.41 CHRONIC MIDLINE LOW BACK PAIN WITH BILATERAL SCIATICA: Primary | ICD-10-CM

## 2019-08-22 DIAGNOSIS — G89.29 CHRONIC MIDLINE LOW BACK PAIN WITH BILATERAL SCIATICA: Primary | ICD-10-CM

## 2019-08-22 DIAGNOSIS — M54.42 CHRONIC MIDLINE LOW BACK PAIN WITH BILATERAL SCIATICA: Primary | ICD-10-CM

## 2019-08-22 PROCEDURE — 99214 OFFICE O/P EST MOD 30 MIN: CPT | Performed by: INTERNAL MEDICINE

## 2019-08-22 RX ORDER — DULOXETIN HYDROCHLORIDE 20 MG/1
CAPSULE, DELAYED RELEASE ORAL
Refills: 2 | COMMUNITY
Start: 2019-08-02

## 2019-08-22 ASSESSMENT — ENCOUNTER SYMPTOMS
VOMITING: 0
BLOOD IN STOOL: 0
CONSTIPATION: 1
EYES NEGATIVE: 1
SHORTNESS OF BREATH: 0
BACK PAIN: 1
ABDOMINAL PAIN: 0
COUGH: 0
DIARRHEA: 0
RESPIRATORY NEGATIVE: 1
WHEEZING: 0
COLOR CHANGE: 0
EYE REDNESS: 0
EYE PAIN: 0
NAUSEA: 1

## 2019-08-22 NOTE — PROGRESS NOTES
sleep disturbance. The patient is not nervous/anxious. PAST MEDICAL HISTORY      Past Medical History:   Diagnosis Date    Fatty liver 2018    Fibromyalgia     GERD (gastroesophageal reflux disease)     Osteoporosis        PAST SURGICAL HISTORY      Past Surgical History:   Procedure Laterality Date    CATARACT REMOVAL  2014     SECTION  6831,1783,3187    CHOLECYSTECTOMY  2016    COLONOSCOPY  2014    DILATION AND CURETTAGE OF UTERUS  1981    UPPER GASTROINTESTINAL ENDOSCOPY  2016       FAMILY HISTORY      Family History   Problem Relation Age of Onset    Lupus Mother     Arthritis Mother         MCTD / lupus    Rheum Arthritis Maternal Cousin     Rheum Arthritis Paternal Cousin     Rheum Arthritis Daughter        SOCIAL HISTORY      Social History     Tobacco History     Smoking Status  Never Smoker    Smokeless Tobacco Use  Never Used          Alcohol History     Alcohol Use Status  No          Drug Use     Drug Use Status  No          Sexual Activity     Sexually Active  Not Asked                ALLERGIES     Allergies   Allergen Reactions    Sulfa Antibiotics Nausea And Vomiting       CURRENT MEDICATIONS      Current Outpatient Medications   Medication Sig Dispense Refill    DULoxetine (CYMBALTA) 20 MG extended release capsule TAKE 1 CAPSULE BY MOUTH EVERY DAY  2    minocycline (DYNACIN) 100 MG tablet TAKE 1 TABLET BY MOUTH TWICE A DAY 60 tablet 3    Etanercept (ENBREL MINI) 50 MG/ML SOCT Inject 50 mg into the skin every 7 days 4 mL 5    predniSONE (DELTASONE) 2.5 MG tablet Take 0.5 tablets by mouth daily 30 tablet 3    Naproxen Sodium (ALEVE) 220 MG CAPS Take 220 mg by mouth daily as needed for Pain      cycloSPORINE (RESTASIS) 0.05 % ophthalmic emulsion 1 drop 2 times daily       No current facility-administered medications for this visit.         PHYSICAL EXAMINATION / OBJECTIVE     Objective:  BP (!) 140/82 (Site: Right Upper Arm, Position: Sitting, Cuff Size: Large Adult)

## 2019-09-03 ENCOUNTER — TELEPHONE (OUTPATIENT)
Dept: RHEUMATOLOGY | Age: 54
End: 2019-09-03

## 2019-09-12 ENCOUNTER — TELEPHONE (OUTPATIENT)
Dept: RHEUMATOLOGY | Age: 54
End: 2019-09-12

## 2019-09-12 DIAGNOSIS — G89.29 CHRONIC MIDLINE LOW BACK PAIN WITH BILATERAL SCIATICA: Primary | ICD-10-CM

## 2019-09-12 DIAGNOSIS — M54.41 CHRONIC MIDLINE LOW BACK PAIN WITH BILATERAL SCIATICA: Primary | ICD-10-CM

## 2019-09-12 DIAGNOSIS — M54.42 CHRONIC MIDLINE LOW BACK PAIN WITH BILATERAL SCIATICA: Primary | ICD-10-CM

## 2019-09-12 NOTE — TELEPHONE ENCOUNTER
Received a call from [facility] MRI stating patient is scheduled for tomorrow. The order is currently for MRI lumbar spine W Contrast.     They stated they do not complete orders that have just \"W contrast\".   The order needs to state MRI Lumbar spine W/WO  Contrast.     New order pending

## 2019-09-13 ENCOUNTER — HOSPITAL ENCOUNTER (OUTPATIENT)
Dept: MRI IMAGING | Age: 54
Discharge: HOME | End: 2019-09-13
Payer: COMMERCIAL

## 2019-09-13 DIAGNOSIS — M54.41 CHRONIC MIDLINE LOW BACK PAIN WITH BILATERAL SCIATICA: ICD-10-CM

## 2019-09-13 DIAGNOSIS — G89.29 CHRONIC MIDLINE LOW BACK PAIN WITH BILATERAL SCIATICA: ICD-10-CM

## 2019-09-13 DIAGNOSIS — M54.42 CHRONIC MIDLINE LOW BACK PAIN WITH BILATERAL SCIATICA: ICD-10-CM

## 2019-09-13 PROCEDURE — 72148 MRI LUMBAR SPINE W/O DYE: CPT

## 2019-09-16 ENCOUNTER — TELEPHONE (OUTPATIENT)
Dept: RHEUMATOLOGY | Age: 54
End: 2019-09-16

## 2019-09-16 DIAGNOSIS — Z51.81 MEDICATION MONITORING ENCOUNTER: Primary | ICD-10-CM

## 2019-09-16 NOTE — TELEPHONE ENCOUNTER
----- Message from Van Snow DO sent at 9/13/2019 12:28 PM EDT -----  The MRI of the lower back revealed degenerative arthritis but no other significant abnormalities

## 2019-10-16 DIAGNOSIS — Z23 ENCOUNTER FOR VACCINATION: ICD-10-CM

## 2019-10-17 RX ORDER — PREDNISONE 2.5 MG
TABLET ORAL
Qty: 45 TABLET | Refills: 2 | Status: SHIPPED | OUTPATIENT
Start: 2019-10-17 | End: 2020-07-14

## 2019-11-02 DIAGNOSIS — M05.79 RHEUMATOID ARTHRITIS INVOLVING MULTIPLE SITES WITH POSITIVE RHEUMATOID FACTOR (HCC): ICD-10-CM

## 2019-11-04 RX ORDER — MINOCYCLINE HYDROCHLORIDE 100 MG/1
TABLET ORAL
Qty: 60 TABLET | Refills: 3 | OUTPATIENT
Start: 2019-11-04

## 2019-11-21 ENCOUNTER — TELEPHONE (OUTPATIENT)
Dept: RHEUMATOLOGY | Age: 54
End: 2019-11-21

## 2019-11-21 DIAGNOSIS — Z51.81 MEDICATION MONITORING ENCOUNTER: Primary | ICD-10-CM

## 2019-11-21 LAB
ALBUMIN SERPL-MCNC: 3.8 G/DL
ALP BLD-CCNC: 91 U/L
ALT SERPL-CCNC: 35 U/L
ANION GAP SERPL CALCULATED.3IONS-SCNC: 1.1 MMOL/L
AST SERPL-CCNC: 24 U/L
BASOPHILS ABSOLUTE: 0.01 /ΜL
BASOPHILS RELATIVE PERCENT: 0.2 %
BILIRUB SERPL-MCNC: 0.4 MG/DL (ref 0.1–1.4)
BUN BLDV-MCNC: 18 MG/DL
C-REACTIVE PROTEIN: 2.7
CALCIUM SERPL-MCNC: 9.5 MG/DL
CHLORIDE BLD-SCNC: 104 MMOL/L
CO2: 29 MMOL/L
CREAT SERPL-MCNC: 0.8 MG/DL
EOSINOPHILS ABSOLUTE: 0.15 /ΜL
EOSINOPHILS RELATIVE PERCENT: 2.4 %
GFR CALCULATED: NORMAL
GLUCOSE BLD-MCNC: 85 MG/DL
HCT VFR BLD CALC: 38.6 % (ref 36–46)
HEMOGLOBIN: 12.8 G/DL (ref 12–16)
LYMPHOCYTES ABSOLUTE: 1.39 /ΜL
LYMPHOCYTES RELATIVE PERCENT: 21.8 %
MCH RBC QN AUTO: 29 PG
MCHC RBC AUTO-ENTMCNC: 33.2 G/DL
MCV RBC AUTO: 87.3 FL
MONOCYTES ABSOLUTE: 0.68 /ΜL
MONOCYTES RELATIVE PERCENT: 10.7 %
NEUTROPHILS ABSOLUTE: 4.13 /ΜL
NEUTROPHILS RELATIVE PERCENT: 64.6 %
PDW BLD-RTO: 42.9 %
PLATELET # BLD: 218 K/ΜL
PMV BLD AUTO: 11 FL
POTASSIUM SERPL-SCNC: 4 MMOL/L
RBC # BLD: 4.42 10^6/ΜL
SEDIMENTATION RATE, ERYTHROCYTE: 17
SODIUM BLD-SCNC: 142 MMOL/L
TOTAL PROTEIN: 7.2
WBC # BLD: 6.38 10^3/ML

## 2019-12-05 ENCOUNTER — OFFICE VISIT (OUTPATIENT)
Dept: RHEUMATOLOGY | Age: 54
End: 2019-12-05
Payer: COMMERCIAL

## 2019-12-05 VITALS
HEART RATE: 110 BPM | RESPIRATION RATE: 16 BRPM | BODY MASS INDEX: 39.2 KG/M2 | DIASTOLIC BLOOD PRESSURE: 70 MMHG | SYSTOLIC BLOOD PRESSURE: 120 MMHG | OXYGEN SATURATION: 98 % | WEIGHT: 213 LBS | HEIGHT: 62 IN

## 2019-12-05 DIAGNOSIS — M54.41 CHRONIC MIDLINE LOW BACK PAIN WITH BILATERAL SCIATICA: ICD-10-CM

## 2019-12-05 DIAGNOSIS — G89.29 CHRONIC MIDLINE LOW BACK PAIN WITH BILATERAL SCIATICA: ICD-10-CM

## 2019-12-05 DIAGNOSIS — M05.79 RHEUMATOID ARTHRITIS INVOLVING MULTIPLE SITES WITH POSITIVE RHEUMATOID FACTOR (HCC): Primary | ICD-10-CM

## 2019-12-05 DIAGNOSIS — Z51.81 MEDICATION MONITORING ENCOUNTER: ICD-10-CM

## 2019-12-05 DIAGNOSIS — M79.7 FIBROMYALGIA: ICD-10-CM

## 2019-12-05 DIAGNOSIS — M54.42 CHRONIC MIDLINE LOW BACK PAIN WITH BILATERAL SCIATICA: ICD-10-CM

## 2019-12-05 PROCEDURE — 99214 OFFICE O/P EST MOD 30 MIN: CPT | Performed by: INTERNAL MEDICINE

## 2019-12-05 ASSESSMENT — JOINT PAIN
TOTAL NUMBER OF SWOLLEN JOINTS: 1
TOTAL NUMBER OF TENDER JOINTS: 5

## 2019-12-05 ASSESSMENT — ENCOUNTER SYMPTOMS
COUGH: 0
CONSTIPATION: 0
NAUSEA: 1
EYE REDNESS: 0
EYE PAIN: 0
WHEEZING: 0
EYES NEGATIVE: 1
SHORTNESS OF BREATH: 0
ABDOMINAL PAIN: 0
VOMITING: 0
BLOOD IN STOOL: 0
COLOR CHANGE: 0
DIARRHEA: 0
BACK PAIN: 1
RESPIRATORY NEGATIVE: 1

## 2019-12-10 ENCOUNTER — TELEPHONE (OUTPATIENT)
Dept: RHEUMATOLOGY | Age: 54
End: 2019-12-10

## 2019-12-10 DIAGNOSIS — M05.79 RHEUMATOID ARTHRITIS INVOLVING MULTIPLE SITES WITH POSITIVE RHEUMATOID FACTOR (HCC): ICD-10-CM

## 2019-12-10 RX ORDER — MINOCYCLINE HYDROCHLORIDE 100 MG/1
TABLET ORAL
Qty: 60 TABLET | Refills: 3 | Status: SHIPPED | OUTPATIENT
Start: 2019-12-10 | End: 2020-04-22

## 2020-01-20 ENCOUNTER — NURSE ONLY (OUTPATIENT)
Dept: LAB | Age: 55
End: 2020-01-20

## 2020-01-20 ENCOUNTER — OFFICE VISIT (OUTPATIENT)
Dept: RHEUMATOLOGY | Age: 55
End: 2020-01-20
Payer: COMMERCIAL

## 2020-01-20 VITALS
DIASTOLIC BLOOD PRESSURE: 80 MMHG | HEART RATE: 63 BPM | WEIGHT: 211.64 LBS | BODY MASS INDEX: 38.95 KG/M2 | HEIGHT: 62 IN | SYSTOLIC BLOOD PRESSURE: 134 MMHG

## 2020-01-20 LAB
ALBUMIN SERPL-MCNC: 4.5 G/DL (ref 3.5–5.1)
ALP BLD-CCNC: 109 U/L (ref 38–126)
ALT SERPL-CCNC: 40 U/L (ref 11–66)
ANION GAP SERPL CALCULATED.3IONS-SCNC: 12 MEQ/L (ref 8–16)
AST SERPL-CCNC: 31 U/L (ref 5–40)
BASOPHILS # BLD: 0.3 %
BASOPHILS ABSOLUTE: 0 THOU/MM3 (ref 0–0.1)
BILIRUB SERPL-MCNC: 0.4 MG/DL (ref 0.3–1.2)
BUN BLDV-MCNC: 8 MG/DL (ref 7–22)
C-REACTIVE PROTEIN: 0.68 MG/DL (ref 0–1)
CALCIUM SERPL-MCNC: 9.7 MG/DL (ref 8.5–10.5)
CHLORIDE BLD-SCNC: 101 MEQ/L (ref 98–111)
CO2: 26 MEQ/L (ref 23–33)
CREAT SERPL-MCNC: 0.6 MG/DL (ref 0.4–1.2)
EOSINOPHIL # BLD: 2.1 %
EOSINOPHILS ABSOLUTE: 0.2 THOU/MM3 (ref 0–0.4)
ERYTHROCYTE [DISTWIDTH] IN BLOOD BY AUTOMATED COUNT: 13.8 % (ref 11.5–14.5)
ERYTHROCYTE [DISTWIDTH] IN BLOOD BY AUTOMATED COUNT: 46.2 FL (ref 35–45)
GFR SERPL CREATININE-BSD FRML MDRD: > 90 ML/MIN/1.73M2
GLUCOSE BLD-MCNC: 109 MG/DL (ref 70–108)
HCT VFR BLD CALC: 42.4 % (ref 37–47)
HEMOGLOBIN: 13.3 GM/DL (ref 12–16)
IMMATURE GRANS (ABS): 0.02 THOU/MM3 (ref 0–0.07)
IMMATURE GRANULOCYTES: 0.3 %
LYMPHOCYTES # BLD: 24.3 %
LYMPHOCYTES ABSOLUTE: 1.7 THOU/MM3 (ref 1–4.8)
MCH RBC QN AUTO: 28.7 PG (ref 26–33)
MCHC RBC AUTO-ENTMCNC: 31.4 GM/DL (ref 32.2–35.5)
MCV RBC AUTO: 91.6 FL (ref 81–99)
MONOCYTES # BLD: 10.5 %
MONOCYTES ABSOLUTE: 0.8 THOU/MM3 (ref 0.4–1.3)
NUCLEATED RED BLOOD CELLS: 0 /100 WBC
PLATELET # BLD: 218 THOU/MM3 (ref 130–400)
PMV BLD AUTO: 11.2 FL (ref 9.4–12.4)
POTASSIUM SERPL-SCNC: 4.3 MEQ/L (ref 3.5–5.2)
RBC # BLD: 4.63 MILL/MM3 (ref 4.2–5.4)
SEDIMENTATION RATE, ERYTHROCYTE: 20 MM/HR (ref 0–20)
SEG NEUTROPHILS: 62.5 %
SEGMENTED NEUTROPHILS ABSOLUTE COUNT: 4.5 THOU/MM3 (ref 1.8–7.7)
SODIUM BLD-SCNC: 139 MEQ/L (ref 135–145)
TOTAL PROTEIN: 7.3 G/DL (ref 6.1–8)
WBC # BLD: 7.2 THOU/MM3 (ref 4.8–10.8)

## 2020-01-20 PROCEDURE — 99214 OFFICE O/P EST MOD 30 MIN: CPT | Performed by: NURSE PRACTITIONER

## 2020-01-20 ASSESSMENT — ENCOUNTER SYMPTOMS
EYE ITCHING: 0
DIARRHEA: 0
CONSTIPATION: 0
EYE PAIN: 0
ABDOMINAL PAIN: 0
TROUBLE SWALLOWING: 0
SHORTNESS OF BREATH: 0
BACK PAIN: 1
COUGH: 0

## 2020-01-20 ASSESSMENT — JOINT PAIN
TOTAL NUMBER OF TENDER JOINTS: 4
TOTAL NUMBER OF SWOLLEN JOINTS: 1

## 2020-01-20 NOTE — PROGRESS NOTES
[facility] RHEUMATOLOGY FOLLOW UP NOTE       Date Of Service: 1/20/2020  Provider: XIANG Lynch - CNP    Name: Apolonia Malave   MRN: [id number]    Chief Complaint(s)     Chief Complaint   Patient presents with    Follow-up     6 week follow up        History of Present Illness (HPI)     Apolonia Malave  is a(n)54 y.o. female with a hx of fibromyalgia, osteoporosis, GERD, rheumatoid arthritis (+, CCP 24 2012), h/o hemorrhoids, fatty liver, dry eyes, HTN here for the f/u evaluation of rheumatoid arthritis, fibromyalgia. Interval hx:    - Abdominal pain and GI upset with Daryn , stopped the medication. Unsure if it helped with the joint pains.    - Having a good day today, little pain    pain affecting the neck  Pain on a scale 0-10: 5/10  Type of pain: constant neck   Timing: n/a  Aggravating factors:  driving  Alleviating factors: naproxen PRN, prednisone, tramadol    Associated symptoms:  - swelling/  Redness/ warmth, + AM stiffness lasting ~ 15-30 minutes,  +  gelling      Previous treatment:  Gabapentin (improved sleep), plaquenil (auditory changes), arava 10 mg (RUQ abd pain), MTX (fatigue, GI upset), imuran (RUQ pain, sept 2018), enbrel (rash, June- aug 2019), remicaide (insurance issues), prednisone (sig relief), Xeljanz 5 mg BID (abdominal pain, GI upset)      REVIEW OF SYSTEMS: (ROS)    Review of Systems   Constitutional: Positive for fatigue. Negative for fever and unexpected weight change. HENT: Negative for congestion and trouble swallowing. Eyes: Negative for pain and itching. Respiratory: Negative for cough and shortness of breath. Cardiovascular: Positive for leg swelling. Negative for chest pain. Gastrointestinal: Negative for abdominal pain, constipation and diarrhea. Endocrine: Negative for cold intolerance and heat intolerance. Genitourinary: Negative for difficulty urinating, frequency and urgency.    Musculoskeletal: Positive for arthralgias, back pain, joint swelling and neck pain. Skin: Negative for rash. Neurological: Negative for dizziness, weakness, numbness and headaches. Psychiatric/Behavioral: The patient is not nervous/anxious.         PAST MEDICAL HISTORY      Past Medical History:   Diagnosis Date    Fatty liver 2018    Fibromyalgia     GERD (gastroesophageal reflux disease)     Osteoporosis        PAST SURGICAL HISTORY      Past Surgical History:   Procedure Laterality Date    CATARACT REMOVAL  2014     SECTION  4900,2190,[id number]    CHOLECYSTECTOMY  2016    COLONOSCOPY  2014    DILATION AND CURETTAGE OF UTERUS  1981    UPPER GASTROINTESTINAL ENDOSCOPY  2016       FAMILY HISTORY      Family History   Problem Relation Age of Onset    Lupus Mother     Arthritis Mother         MCTD / lupus    Rheum Arthritis Maternal Cousin     Rheum Arthritis Paternal Cousin     Rheum Arthritis Daughter        SOCIAL HISTORY      Social History     Tobacco History     Smoking Status  Never Smoker    Smokeless Tobacco Use  Never Used          Alcohol History     Alcohol Use Status  No          Drug Use     Drug Use Status  No          Sexual Activity     Sexually Active  Not Asked                ALLERGIES     Allergies   Allergen Reactions    Sulfa Antibiotics Nausea And Vomiting       CURRENT MEDICATIONS      Current Outpatient Medications   Medication Sig Dispense Refill    Adalimumab (HUMIRA) 40 MG/0.4ML PSKT Inject 40 mg into the skin every 14 days 2 each 5    minocycline (DYNACIN) 100 MG tablet TAKE 1 TABLET BY MOUTH TWICE A DAY 60 tablet 3    Tofacitinib Citrate (XELJANZ) 5 MG TABS Take 5 mg by mouth 2 times daily 60 tablet 1    predniSONE (DELTASONE) 2.5 MG tablet TAKE ONE HALF TAB BY MOUTH EVERY DAY 45 tablet 2    DULoxetine (CYMBALTA) 20 MG extended release capsule TAKE 1 CAPSULE BY MOUTH EVERY DAY  2    Naproxen Sodium (ALEVE) 220 MG CAPS Take 220 mg by mouth daily as needed for Pain      cycloSPORINE (RESTASIS) 0.05 % ophthalmic emulsion 1 drop 2 times daily       No current facility-administered medications for this visit. PHYSICAL EXAMINATION / OBJECTIVE   Objective:  /80 (Site: Left Upper Arm, Position: Sitting, Cuff Size: Large Adult)   Pulse 63   Ht 5' 2\" (1.575 m)   Wt 211 lb 10.3 oz (96 kg)   BMI 38.71 kg/m²     Physical Exam  Vitals signs reviewed. Constitutional:       Appearance: She is well-developed. Neck:      Musculoskeletal: Normal range of motion and neck supple. Cardiovascular:      Rate and Rhythm: Normal rate and regular rhythm. Pulmonary:      Effort: Pulmonary effort is normal.      Breath sounds: Normal breath sounds. Abdominal:      Palpations: Abdomen is soft. Tenderness: There is no tenderness. Skin:     General: Skin is warm and dry. Findings: No rash. Neurological:      Mental Status: She is alert and oriented to person, place, and time. Deep Tendon Reflexes: Reflexes are normal and symmetric. Psychiatric:         Thought Content: Thought content normal.         Musculoskeletal:     Normal gait. Strength 5/5 in biceps, triceps, hips, knees,      Upper extremities:   Shoulders:  Tender bilateral No Deformities and intact ROM  Elbows:      Nontender, no swelling, No Deformities and intact ROM  Wrists        Nontender, no swelling, No Deformities and intact ROM  Hands:     Nontender, swelling below    Lower extremities:  Hips:      + tender bilat,  No Deformities and intact ROM  Knees :   Tender bilat, intact ROM  Ankles:   Nontender, no swellingNo Deformities and intact ROM  Feet:       + MTP compression bilat, + midfoot tenderness right foot.  No swelling      Spine:     C-spine: tender cervical/trap region  Tenderness lumbar spine        Physical Exam     Tenderness:   Right sternoclavicular and left sternoclavicular  RUE: glenohumeral  LUE: glenohumeral  RLE: tibiofemoral  LLE: tibiofemoral  Right foot: 1st MTP, 2nd MTP, 3rd MTP, 4th MTP and 5th MTP  Left foot: 1st MTP, 2nd MTP, 3rd MTP, 4th MTP and 5th MTP    Swelling:   Right sternoclavicular and left sternoclavicular  Left hand: 4th PIP    BROWNING-28 tender joint count: 4  BROWNING-28 swollen joint count: 1  CRP (mg/L): 2.7  Patient global assessment: 50  BROWNING-28 CRP score: 3.53 (Moderate Disease Activity)         LABS    CBC  Lab Results   Component Value Date    WBC 6.38 11/21/2019    RBC 4.42 11/21/2019    HGB 12.8 11/21/2019    HCT 38.6 11/21/2019    MCV 87.3 11/21/2019    MCH 29.0 11/21/2019    MCHC 33.2 11/21/2019    RDW 42.9 11/21/2019     11/21/2019       CMP  Lab Results   Component Value Date    CALCIUM 9.5 11/21/2019    LABALBU 3.8 11/21/2019     11/21/2019    K 4.0 11/21/2019    CO2 29 11/21/2019     11/21/2019    BUN 18 11/21/2019    CREATININE 0.8 11/21/2019    ALKPHOS 91 11/21/2019    ALT 35 11/21/2019    AST 24 11/21/2019       HgBA1c: No components found for: HGBA1C    No results found for: VITD25      No results found for: ANASCRN  No results found for: SSA  No results found for: SSB  No results found for: ANTI-SMITH  No results found for: DSDNAAB   No results found for: ANTIRNP  No results found for: C3, C4  No results found for: CCPAB  Lab Results   Component Value Date    .5 07/23/2018       No components found for: CANCASCRN, APANCASCRN  Lab Results   Component Value Date    SEDRATE 17 11/21/2019     Lab Results   Component Value Date    CRP 2.7 11/21/2019       RADIOLOGY:         ASSESSMENT/PLAN    Assessment   Plan     Rheumatoid arthritis  +RF, +CCP   - Continue prednisone 2.5 mg daily   - Minocycline 100 mg BD (off label use for RA)   - Stop Lupillo Dimas due to side effects of GI upset and abdominal pain   - Re-trial Anti- TNF therapy: start humira 40 mg subQ every 2 weeks.  TNF INHIBITORS can cause increased risk of TB reactivation, oppurtunistic infections, lupus like illness,  rash, hypersensitivity reaction, infusion reactions, demyelinating disease and possible  risk of malignancies and lung diseases and were explained to the patient. Chronic low back pain w/ bilateral radicular sx's  - + paresthesias of anterior shins, leg heaviness with prolonged walking and standing that improved w/ walking walking. + urgency with urination, reported saddle anesthesia for the past 2-3 months. Weakness bilateral legs with prolonged walking/standing. Denies incontinence. - MRI low back revealed degenerative arthritis but no other significant abnormalities    Sicca sx's  - secondary to RA    Fibromyalgia  - prior tx: gabapentin, flexeril   - cymbalta 20 mg daily (June 2019)    Right> left arm paresthesias  - + tinel bilateral wrists and right elbow   - wrist splinting nightly    Medication monitoring   - CBC, CMP, sed rate, CRP q 4 weeks w/ Humira start   - TB gold negative (April 2019)      Follow up as scheduled with Dr. Leo French      Electronically signed by XIANG Shaw - CNP on 1/20/2020 at 3:53 PM    New Prescriptions    ADALIMUMAB (HUMIRA) 40 MG/0.4ML PSKT    Inject 40 mg into the skin every 14 days       Thank you for allowing me to participate in the care of this patient. Please call if there are any questions.

## 2020-01-20 NOTE — PATIENT INSTRUCTIONS
Inhibits TNF from binding to receptor sites  Side effects: Rare hepatitis, drug induced lupus, increased risk respiratory infections, increased risk of lymphoma, infusion reaction  Contraindications: CHF (class III or IV), history of MS, active TB, untreated hep B, fungal infections  Need TB and hepatitis prior to starting  Avoid live vaccinations   CAN use in caution with pregnancy  Etanercept (Enbrel)   Fusion protein (receptor blocker)   Dose: 25-50 mg SC weekly   Does not work for [place], Ulcerative Colitis, uveitis  Adalimumab (Humira)   Fully humanized monoclonal ab   Dose: 40 mg SC every 2 weeks  Certolizumab pegol (Cimzia)   Jet fragment welded to polyethelene glycol   200 mg SC every 2 weeks  Golimumab (Simponi)   Fully humanized monoclonal ab   50 mg SC monthly or 2 mg/kg IV every 8 weeks  Infliximab (Remicaide)   Chimeric monoclonal ab   Dose: 3-5 mg/kg IV at 1, 2, & 6 then every 8 weeks

## 2020-04-22 RX ORDER — MINOCYCLINE HYDROCHLORIDE 100 MG/1
TABLET ORAL
Qty: 60 TABLET | Refills: 3 | Status: SHIPPED | OUTPATIENT
Start: 2020-04-22 | End: 2021-08-26 | Stop reason: ALTCHOICE

## 2020-06-01 ENCOUNTER — TELEPHONE (OUTPATIENT)
Dept: RHEUMATOLOGY | Age: 55
End: 2020-06-01

## 2020-06-01 NOTE — TELEPHONE ENCOUNTER
Pt is calling and stating her lab order  and she needs a new one sent to [facility].  Please advise pt at [phone]

## 2020-06-11 LAB
ALBUMIN SERPL-MCNC: 4.1 G/DL
ALP BLD-CCNC: 76 U/L
ALT SERPL-CCNC: 41 U/L
ANION GAP SERPL CALCULATED.3IONS-SCNC: NORMAL MMOL/L
AST SERPL-CCNC: 28 U/L
BASOPHILS ABSOLUTE: 0.01 /ΜL
BASOPHILS RELATIVE PERCENT: 0.2 %
BILIRUB SERPL-MCNC: 0.6 MG/DL (ref 0.1–1.4)
BUN BLDV-MCNC: 12 MG/DL
C-REACTIVE PROTEIN: 1.3
CALCIUM SERPL-MCNC: 9.4 MG/DL
CHLORIDE BLD-SCNC: 105 MMOL/L
CO2: 26 MMOL/L
CREAT SERPL-MCNC: 0.9 MG/DL
EOSINOPHILS ABSOLUTE: 0.11 /ΜL
EOSINOPHILS RELATIVE PERCENT: 1.8 %
GFR CALCULATED: 69
GLUCOSE BLD-MCNC: 113 MG/DL
HCT VFR BLD CALC: 39.6 % (ref 36–46)
HEMOGLOBIN: 13.1 G/DL (ref 12–16)
LYMPHOCYTES ABSOLUTE: 2.33 /ΜL
LYMPHOCYTES RELATIVE PERCENT: 37.4 %
MCH RBC QN AUTO: 28.5 PG
MCHC RBC AUTO-ENTMCNC: 33.1 G/DL
MCV RBC AUTO: 86.1 FL
MONOCYTES ABSOLUTE: 0.66 /ΜL
MONOCYTES RELATIVE PERCENT: 10.6 %
NEUTROPHILS ABSOLUTE: 3.1 /ΜL
NEUTROPHILS RELATIVE PERCENT: 49.7 %
PDW BLD-RTO: 42.3 %
PLATELET # BLD: 228 K/ΜL
PMV BLD AUTO: 11.8 FL
POTASSIUM SERPL-SCNC: 3.9 MMOL/L
RBC # BLD: 4.6 10^6/ΜL
SEDIMENTATION RATE, ERYTHROCYTE: 8
SODIUM BLD-SCNC: 141 MMOL/L
TOTAL PROTEIN: 7.1
WBC # BLD: 6.23 10^3/ML

## 2020-06-18 ENCOUNTER — OFFICE VISIT (OUTPATIENT)
Dept: RHEUMATOLOGY | Age: 55
End: 2020-06-18
Payer: COMMERCIAL

## 2020-06-18 VITALS
HEIGHT: 62 IN | HEART RATE: 94 BPM | WEIGHT: 193.8 LBS | TEMPERATURE: 97.1 F | OXYGEN SATURATION: 98 % | BODY MASS INDEX: 35.66 KG/M2 | SYSTOLIC BLOOD PRESSURE: 130 MMHG | DIASTOLIC BLOOD PRESSURE: 80 MMHG

## 2020-06-18 PROCEDURE — 99214 OFFICE O/P EST MOD 30 MIN: CPT | Performed by: INTERNAL MEDICINE

## 2020-06-18 RX ORDER — OMEPRAZOLE 40 MG/1
CAPSULE, DELAYED RELEASE ORAL
COMMUNITY
Start: 2020-05-20

## 2020-06-18 RX ORDER — CLOTRIMAZOLE AND BETAMETHASONE DIPROPIONATE 10; .64 MG/G; MG/G
CREAM TOPICAL 2 TIMES DAILY
COMMUNITY

## 2020-06-18 ASSESSMENT — ENCOUNTER SYMPTOMS
EYE ITCHING: 0
CONSTIPATION: 0
SHORTNESS OF BREATH: 0
COUGH: 0
DIARRHEA: 0
TROUBLE SWALLOWING: 0
EYE PAIN: 0
BACK PAIN: 1
ABDOMINAL PAIN: 0

## 2020-06-18 NOTE — PROGRESS NOTES
diarrhea. Endocrine: Negative for cold intolerance and heat intolerance. Genitourinary: Negative for difficulty urinating, frequency and urgency. Musculoskeletal: Positive for arthralgias, back pain, joint swelling and neck pain. Skin: Negative for rash. Neurological: Negative for dizziness, weakness, numbness and headaches. Psychiatric/Behavioral: The patient is not nervous/anxious. PAST MEDICAL HISTORY      Past Medical History:   Diagnosis Date    Fatty liver 2018    Fibromyalgia     GERD (gastroesophageal reflux disease)     Osteoporosis        PAST SURGICAL HISTORY      Past Surgical History:   Procedure Laterality Date    CATARACT REMOVAL  2014     SECTION  [id number]    CHOLECYSTECTOMY  2016    COLONOSCOPY  2014    DILATION AND CURETTAGE OF UTERUS  1981    UPPER GASTROINTESTINAL ENDOSCOPY  2016       FAMILY HISTORY      Family History   Problem Relation Age of Onset    Lupus Mother     Arthritis Mother         MCTD / lupus    Rheum Arthritis Maternal Cousin     Rheum Arthritis Paternal Cousin     Rheum Arthritis Daughter        SOCIAL HISTORY      Social History     Tobacco History     Smoking Status  Never Smoker    Smokeless Tobacco Use  Never Used          Alcohol History     Alcohol Use Status  No          Drug Use     Drug Use Status  No          Sexual Activity     Sexually Active  Not Asked                ALLERGIES     Allergies   Allergen Reactions    Sulfa Antibiotics Nausea And Vomiting       CURRENT MEDICATIONS      Current Outpatient Medications   Medication Sig Dispense Refill    omeprazole (PRILOSEC) 40 MG delayed release capsule TAKE 1 CAPSULE BY MOUTH EVERY DAY      clotrimazole-betamethasone (LOTRISONE) 1-0.05 % cream Apply topically 2 times daily Apply topically 2 times daily.       minocycline (DYNACIN) 100 MG tablet TAKE 1 TABLET BY MOUTH TWICE A DAY 60 tablet 3    Adalimumab (HUMIRA) 40 MG/0.4ML PSKT Inject 40 mg into the skin every 14

## 2020-07-13 RX ORDER — ADALIMUMAB 40MG/0.4ML
KIT SUBCUTANEOUS
Qty: 2 EACH | Refills: 5 | Status: SHIPPED | OUTPATIENT
Start: 2020-07-13 | End: 2021-08-26

## 2020-07-14 RX ORDER — PREDNISONE 2.5 MG
TABLET ORAL
Qty: 45 TABLET | Refills: 2 | Status: SHIPPED | OUTPATIENT
Start: 2020-07-14 | End: 2021-02-11

## 2020-10-22 ENCOUNTER — OFFICE VISIT (OUTPATIENT)
Dept: RHEUMATOLOGY | Age: 55
End: 2020-10-22
Payer: COMMERCIAL

## 2020-10-22 VITALS
OXYGEN SATURATION: 100 % | TEMPERATURE: 98.1 F | HEIGHT: 62 IN | SYSTOLIC BLOOD PRESSURE: 132 MMHG | BODY MASS INDEX: 36.22 KG/M2 | HEART RATE: 64 BPM | WEIGHT: 196.8 LBS | DIASTOLIC BLOOD PRESSURE: 58 MMHG

## 2020-10-22 PROCEDURE — 99214 OFFICE O/P EST MOD 30 MIN: CPT | Performed by: INTERNAL MEDICINE

## 2020-10-22 ASSESSMENT — ENCOUNTER SYMPTOMS
EYE ITCHING: 0
ABDOMINAL PAIN: 0
BACK PAIN: 1
SHORTNESS OF BREATH: 0
DIARRHEA: 0
CONSTIPATION: 0
EYE PAIN: 0
TROUBLE SWALLOWING: 0
COUGH: 0

## 2020-10-22 NOTE — PROGRESS NOTES
[facility] RHEUMATOLOGY FOLLOW UP NOTE       Date Of Service: 10/22/2020  Provider: Frankie Gómez DO    Name: Neo Curtis   MRN: [id number]    Chief Complaint(s)     Chief Complaint   Patient presents with    Follow-up     4 month rheumatoid        History of Present Illness (HPI)     Neo Curtis  is a(n)54 y.o. female with a hx of fibromyalgia, osteoporosis, GERD, rheumatoid arthritis (+, CCP 24 2012), h/o hemorrhoids, fatty liver, dry eyes, HTN here for the f/u evaluation of rheumatoid arthritis, fibromyalgia. Crusting of the left eye increase, + increased rhinorrhea, congestions. itching eye. + watery eyes. Denies photophobia, vision changes. Rheumatoid arthritis --- really good summer. Increased aching pain in the bilatearl hands, wrists since aug 2020. Also with pain in the left elbows, bilateral ankles, toes, neck and back. Pain up to 6/10 Denies join swelling , + redness feet every month  lasting a few days . - swelling/  Redness/ warmth, + AM stiffness lasting ~ 15-30 minutes,  +  Gelling    Generalized bruised sensation, + fatigue, + dry mouth. REVIEW OF SYSTEMS: (ROS)    Review of Systems   Constitutional: Positive for fatigue. Negative for fever and unexpected weight change. HENT: Negative for congestion and trouble swallowing. Eyes: Negative for pain and itching. Respiratory: Negative for cough and shortness of breath. Cardiovascular: Positive for leg swelling. Negative for chest pain. Gastrointestinal: Negative for abdominal pain, constipation and diarrhea. Endocrine: Negative for cold intolerance and heat intolerance. Genitourinary: Negative for difficulty urinating, frequency and urgency. Musculoskeletal: Positive for arthralgias, back pain, joint swelling and neck pain. Skin: Negative for rash. Neurological: Negative for dizziness, weakness, numbness and headaches. Psychiatric/Behavioral: The patient is not nervous/anxious.         PAST MEDICAL HISTORY      Past Medical History:   Diagnosis Date    Fatty liver 2018    Fibromyalgia     GERD (gastroesophageal reflux disease)     Osteoporosis        PAST SURGICAL HISTORY      Past Surgical History:   Procedure Laterality Date    CATARACT REMOVAL  2014     SECTION  [id number]    CHOLECYSTECTOMY  2016    COLONOSCOPY  2014    DILATION AND CURETTAGE OF UTERUS  1981    UPPER GASTROINTESTINAL ENDOSCOPY  2016       FAMILY HISTORY      Family History   Problem Relation Age of Onset    Lupus Mother     Arthritis Mother         MCTD / lupus    Rheum Arthritis Maternal Cousin     Rheum Arthritis Paternal Cousin     Rheum Arthritis Daughter        SOCIAL HISTORY      Social History     Tobacco History     Smoking Status  Never Smoker    Smokeless Tobacco Use  Never Used          Alcohol History     Alcohol Use Status  No          Drug Use     Drug Use Status  No          Sexual Activity     Sexually Active  Not Asked                ALLERGIES     Allergies   Allergen Reactions    Tape [Adhesive Tape]     Sulfa Antibiotics Nausea And Vomiting       CURRENT MEDICATIONS      Current Outpatient Medications   Medication Sig Dispense Refill    predniSONE (DELTASONE) 2.5 MG tablet TAKE ONE HALF TAB BY MOUTH EVERY DAY 45 tablet 2    HUMIRA 40 MG/0.4ML PSKT INJECT 1 SYRINGE UNDER THE SKIN EVERY 14 DAYS. 2 each 5    omeprazole (PRILOSEC) 40 MG delayed release capsule TAKE 1 CAPSULE BY MOUTH EVERY DAY      clotrimazole-betamethasone (LOTRISONE) 1-0.05 % cream Apply topically 2 times daily Apply topically 2 times daily.       DULoxetine (CYMBALTA) 20 MG extended release capsule TAKE 1 CAPSULE BY MOUTH EVERY DAY  2    Naproxen Sodium (ALEVE) 220 MG CAPS Take 220 mg by mouth daily as needed for Pain      cycloSPORINE (RESTASIS) 0.05 % ophthalmic emulsion 1 drop 2 times daily      minocycline (DYNACIN) 100 MG tablet TAKE 1 TABLET BY MOUTH TWICE A DAY (Patient not taking: Reported on 10/22/2020) 60 tablet 3    Tofacitinib Citrate (XELJANZ) 5 MG TABS Take 5 mg by mouth 2 times daily (Patient not taking: Reported on 10/22/2020) 60 tablet 1     No current facility-administered medications for this visit. PHYSICAL EXAMINATION / OBJECTIVE   Objective:  BP (!) 132/58 (Site: Left Upper Arm, Position: Sitting, Cuff Size: Large Adult)   Pulse 64   Temp 98.1 °F (36.7 °C)   Ht 5' 2.01\" (1.575 m)   Wt 196 lb 12.8 oz (89.3 kg)   SpO2 100%   BMI 35.99 kg/m²     Physical Exam  Vitals signs reviewed. Constitutional:       Appearance: She is well-developed. Neck:      Musculoskeletal: Normal range of motion and neck supple. Cardiovascular:      Rate and Rhythm: Normal rate and regular rhythm. Pulmonary:      Effort: Pulmonary effort is normal.      Breath sounds: Normal breath sounds. Abdominal:      Palpations: Abdomen is soft. Tenderness: There is no abdominal tenderness. Skin:     General: Skin is warm and dry. Findings: No rash. Neurological:      Mental Status: She is alert and oriented to person, place, and time. Deep Tendon Reflexes: Reflexes are normal and symmetric. Psychiatric:         Thought Content: Thought content normal.         Musculoskeletal:     Normal gait. Strength 5/5 in biceps, triceps, hips, knees,      Upper extremities:   Shoulders:  Tender bilateral  posterio traps. Elbows:      Nontender, no swelling, No Deformities and intact ROM  Wrists        Nontender, no swelling, No Deformities and intact ROM  Hands:     Nontender, swelling below    Lower extremities:  Hips:      NT, NS   Knees :   Tender bilat, - pes anserine region. Ankles:   Nontender, no swellingNo Deformities and intact ROM  Feet:      Non-tender.  No swelling       Spine:     C-spine: tender cervical/trap region  Tenderness lumbar spine        Physical Exam       LABS    CBC  Lab Results   Component Value Date    WBC 6.23 06/11/2020    RBC 4.60 06/11/2020    HGB 13.1 06/11/2020    HCT 39.6 06/11/2020    MCV 86.1 06/11/2020    MCH 28.5 06/11/2020    MCHC 33.1 06/11/2020    RDW 42.3 06/11/2020     06/11/2020       CMP  Lab Results   Component Value Date    CALCIUM 9.4 06/11/2020    LABALBU 4.1 06/11/2020    PROT 7.3 01/20/2020     06/11/2020    K 3.9 06/11/2020    CO2 26 06/11/2020     06/11/2020    BUN 12 06/11/2020    CREATININE 0.9 06/11/2020    ALKPHOS 76 06/11/2020    ALT 41 06/11/2020    AST 28 06/11/2020       HgBA1c: No components found for: HGBA1C    No results found for: VITD25      No results found for: ANASCRN  No results found for: SSA  No results found for: SSB  No results found for: ANTI-SMITH  No results found for: DSDNAAB   No results found for: ANTIRNP  No results found for: C3, C4  No results found for: CCPAB  Lab Results   Component Value Date    .5 07/23/2018       No components found for: CANCASCRN, APANCASCRN  Lab Results   Component Value Date    SEDRATE 8 06/11/2020     Lab Results   Component Value Date    CRP 1.3 06/11/2020       RADIOLOGY:         ASSESSMENT/PLAN    Assessment   Plan   Prior tx: Gabapentin (improved sleep), plaquenil (auditory changes), arava 10 mg (RUQ abd pain), MTX (fatigue, GI upset), imuran (RUQ pain, sept 2018), enbrel (rash, June- aug 2019), remicaide (insurance issues), prednisone (sig relief), Xeljanz 5 mg BID (abdominal pain, GI upset), cervical facet joint injections (significant relief.)  Minocycline 100 mg BD (off label use for RA) per pt request.     Rheumatoid arthritis - cont tender joints. +RF, +CCP   - prednisone 1.25 mg daily --- will attempt to d/c if labs stable. - humira 40mg 2 weeks. (April 28, 2020)     Chronic low back pain w/ bilateral radicular sx's  - MRI low back revealed degenerative arthritis but no other significant abnormalities  - active and stable. Sicca sx's - secondary to RA    Fibromyalgia - cont.  Pain and worsneing fatigue.   - prior tx: gabapentin, flexeril    - cymbalta 20 mg daily (June 2019) --- declined titration. - encourage patient to continue exercise    Right> left arm paresthesias -- resolved. Medication monitoring   - CBC, CMP, sed rate, CRP q 4 weeks w/ Humira start   - TB gold negative (April 2019)            Electronically signed by Paulina Dutta DO on 10/22/2020 at 3:13 PM    New Prescriptions    No medications on file       Thank you for allowing me to participate in the care of this patient. Please call if there are any questions.

## 2021-01-04 LAB
ALBUMIN SERPL-MCNC: 4 G/DL
ALP BLD-CCNC: 87 U/L
ALT SERPL-CCNC: 40 U/L
ANION GAP SERPL CALCULATED.3IONS-SCNC: 13 MMOL/L
AST SERPL-CCNC: 23 U/L
BASOPHILS ABSOLUTE: 0.02 /ΜL
BASOPHILS RELATIVE PERCENT: 0.3 %
BILIRUB SERPL-MCNC: 0.5 MG/DL (ref 0.1–1.4)
BUN BLDV-MCNC: 11 MG/DL
C-REACTIVE PROTEIN: 0.9
CALCIUM SERPL-MCNC: 9.1 MG/DL
CHLORIDE BLD-SCNC: 101 MMOL/L
CO2: 30 MMOL/L
CREAT SERPL-MCNC: 0.8 MG/DL
EOSINOPHILS ABSOLUTE: 0.15 /ΜL
EOSINOPHILS RELATIVE PERCENT: 2.3 %
GFR CALCULATED: 79
GLUCOSE BLD-MCNC: 91 MG/DL
HCT VFR BLD CALC: 39.7 % (ref 36–46)
HEMOGLOBIN: 13 G/DL (ref 12–16)
LYMPHOCYTES ABSOLUTE: 2.29 /ΜL
LYMPHOCYTES RELATIVE PERCENT: 34.6 %
MCH RBC QN AUTO: 28.8 PG
MCHC RBC AUTO-ENTMCNC: 32.7 G/DL
MCV RBC AUTO: 87.8 FL
MONOCYTES ABSOLUTE: 0.67 /ΜL
MONOCYTES RELATIVE PERCENT: 10.1 %
NEUTROPHILS ABSOLUTE: 3.46 /ΜL
NEUTROPHILS RELATIVE PERCENT: 52.4 %
PDW BLD-RTO: 41.1 %
PLATELET # BLD: 232 K/ΜL
PMV BLD AUTO: 10.6 FL
POTASSIUM SERPL-SCNC: 4 MMOL/L
RBC # BLD: 4.52 10^6/ΜL
SEDIMENTATION RATE, ERYTHROCYTE: 14
SODIUM BLD-SCNC: 140 MMOL/L
TOTAL PROTEIN: 7.3
WBC # BLD: 6.61 10^3/ML

## 2021-01-05 NOTE — RESULT ENCOUNTER NOTE
Lab testing stable. Rheumatoid arthritis: Continue Humira 40 mg every 2 weeks     Medication monitoring repeat blood testing q. 3 to 4 months.

## 2021-01-06 ENCOUNTER — TELEPHONE (OUTPATIENT)
Dept: RHEUMATOLOGY | Age: 56
End: 2021-01-06

## 2021-01-06 NOTE — TELEPHONE ENCOUNTER
----- Message from Anette Mendez DO sent at 1/5/2021  4:59 PM EST -----  Lab testing without abnormalities. Please have blood testing repeated in 3 to 4 months.

## 2021-02-10 DIAGNOSIS — Z23 ENCOUNTER FOR VACCINATION: ICD-10-CM

## 2021-02-11 RX ORDER — PREDNISONE 2.5 MG
TABLET ORAL
Qty: 45 TABLET | Refills: 2 | Status: SHIPPED | OUTPATIENT
Start: 2021-02-11 | End: 2021-04-22

## 2021-04-19 ENCOUNTER — TELEPHONE (OUTPATIENT)
Dept: RHEUMATOLOGY | Age: 56
End: 2021-04-19

## 2021-04-19 DIAGNOSIS — Z51.81 MEDICATION MONITORING ENCOUNTER: Primary | ICD-10-CM

## 2021-04-19 LAB
ALBUMIN SERPL-MCNC: 4 G/DL
ALP BLD-CCNC: 78 U/L
ALT SERPL-CCNC: 34 U/L
ANION GAP SERPL CALCULATED.3IONS-SCNC: 16 MMOL/L
AST SERPL-CCNC: 20 U/L
BASOPHILS ABSOLUTE: 0.02 /ΜL
BASOPHILS RELATIVE PERCENT: 0.3 %
BILIRUB SERPL-MCNC: 0.5 MG/DL (ref 0.1–1.4)
BUN BLDV-MCNC: 14 MG/DL
C-REACTIVE PROTEIN: 0.9
CALCIUM SERPL-MCNC: 9.5 MG/DL
CHLORIDE BLD-SCNC: 103 MMOL/L
CO2: 26 MMOL/L
CREAT SERPL-MCNC: 0.8 MG/DL
EOSINOPHILS ABSOLUTE: 0.15 /ΜL
EOSINOPHILS RELATIVE PERCENT: 2.4 %
GFR CALCULATED: 79
GLUCOSE BLD-MCNC: 119 MG/DL
HCT VFR BLD CALC: 37.6 % (ref 36–46)
HEMOGLOBIN: 12.6 G/DL (ref 12–16)
LYMPHOCYTES ABSOLUTE: 2.48 /ΜL
LYMPHOCYTES RELATIVE PERCENT: 40.4 %
MCH RBC QN AUTO: 29.2 PG
MCHC RBC AUTO-ENTMCNC: 33.5 G/DL
MCV RBC AUTO: 87.2 FL
MONOCYTES ABSOLUTE: 0.59 /ΜL
MONOCYTES RELATIVE PERCENT: 9.6 %
NEUTROPHILS ABSOLUTE: 2.88 /ΜL
NEUTROPHILS RELATIVE PERCENT: 47 %
PDW BLD-RTO: 41 %
PLATELET # BLD: 223 K/ΜL
PMV BLD AUTO: 11.2 FL
POTASSIUM SERPL-SCNC: 3.8 MMOL/L
RBC # BLD: 4.31 10^6/ΜL
SEDIMENTATION RATE, ERYTHROCYTE: 18
SODIUM BLD-SCNC: 141 MMOL/L
TOTAL PROTEIN: 7.4
WBC # BLD: 6.14 10^3/ML

## 2021-04-19 NOTE — TELEPHONE ENCOUNTER
Patient is [facility] to get labs drawn, they do not have a current standing order for the patient and are requesting to have the orders faxed over for the patient.     Fax # [phone]

## 2021-04-21 ENCOUNTER — TELEPHONE (OUTPATIENT)
Dept: RHEUMATOLOGY | Age: 56
End: 2021-04-21

## 2021-04-21 NOTE — TELEPHONE ENCOUNTER
----- Message from Yulia Cullen DO sent at 4/20/2021  5:33 PM EDT -----  Labs are stable. Please repeat blood testing in 4.

## 2021-04-22 ENCOUNTER — NURSE ONLY (OUTPATIENT)
Dept: LAB | Age: 56
End: 2021-04-22

## 2021-04-22 ENCOUNTER — OFFICE VISIT (OUTPATIENT)
Dept: RHEUMATOLOGY | Age: 56
End: 2021-04-22
Payer: COMMERCIAL

## 2021-04-22 ENCOUNTER — HOSPITAL ENCOUNTER (OUTPATIENT)
Age: 56
Discharge: HOME | End: 2021-04-22
Payer: COMMERCIAL

## 2021-04-22 ENCOUNTER — HOSPITAL ENCOUNTER (OUTPATIENT)
Dept: GENERAL RADIOLOGY | Age: 56
Discharge: HOME | End: 2021-04-22
Payer: COMMERCIAL

## 2021-04-22 VITALS
SYSTOLIC BLOOD PRESSURE: 136 MMHG | BODY MASS INDEX: 37.06 KG/M2 | DIASTOLIC BLOOD PRESSURE: 78 MMHG | OXYGEN SATURATION: 93 % | HEART RATE: 76 BPM | WEIGHT: 201.4 LBS | HEIGHT: 62 IN

## 2021-04-22 DIAGNOSIS — M05.79 RHEUMATOID ARTHRITIS INVOLVING MULTIPLE SITES WITH POSITIVE RHEUMATOID FACTOR (HCC): Primary | ICD-10-CM

## 2021-04-22 DIAGNOSIS — M54.41 CHRONIC MIDLINE LOW BACK PAIN WITH BILATERAL SCIATICA: ICD-10-CM

## 2021-04-22 DIAGNOSIS — R30.0 DYSURIA: ICD-10-CM

## 2021-04-22 DIAGNOSIS — Z51.81 MEDICATION MONITORING ENCOUNTER: ICD-10-CM

## 2021-04-22 DIAGNOSIS — G89.29 CHRONIC MIDLINE LOW BACK PAIN WITH BILATERAL SCIATICA: ICD-10-CM

## 2021-04-22 DIAGNOSIS — M54.42 CHRONIC MIDLINE LOW BACK PAIN WITH BILATERAL SCIATICA: ICD-10-CM

## 2021-04-22 DIAGNOSIS — M05.79 RHEUMATOID ARTHRITIS INVOLVING MULTIPLE SITES WITH POSITIVE RHEUMATOID FACTOR (HCC): ICD-10-CM

## 2021-04-22 DIAGNOSIS — M79.7 FIBROMYALGIA: ICD-10-CM

## 2021-04-22 LAB
BACTERIA: NORMAL
BILIRUBIN URINE: NEGATIVE
BLOOD, URINE: NEGATIVE
CASTS: NORMAL /LPF
CASTS: NORMAL /LPF
CHARACTER, URINE: CLEAR
COLOR: YELLOW
CRYSTALS: NORMAL
EPITHELIAL CELLS, UA: NORMAL /HPF
GLUCOSE, URINE: NEGATIVE MG/DL
KETONES, URINE: NEGATIVE
LEUKOCYTE ESTERASE, URINE: NEGATIVE
MISCELLANEOUS LAB TEST RESULT: NORMAL
NITRITE, URINE: NEGATIVE
PH UA: 6.5 (ref 5–9)
PROTEIN UA: NEGATIVE MG/DL
RBC URINE: NORMAL /HPF
RENAL EPITHELIAL, UA: NORMAL
SPECIFIC GRAVITY UA: < 1.005 (ref 1–1.03)
UROBILINOGEN, URINE: 0.2 EU/DL (ref 0–1)
WBC UA: NORMAL /HPF
YEAST: NORMAL

## 2021-04-22 PROCEDURE — 72100 X-RAY EXAM L-S SPINE 2/3 VWS: CPT

## 2021-04-22 PROCEDURE — 99215 OFFICE O/P EST HI 40 MIN: CPT | Performed by: INTERNAL MEDICINE

## 2021-04-22 RX ORDER — LOSARTAN POTASSIUM 25 MG/1
25 TABLET ORAL DAILY
COMMUNITY
End: 2021-08-26

## 2021-04-22 RX ORDER — PREDNISONE 10 MG/1
TABLET ORAL
Qty: 15 TABLET | Refills: 0 | Status: SHIPPED | OUTPATIENT
Start: 2021-04-22 | End: 2021-05-02

## 2021-04-22 ASSESSMENT — ENCOUNTER SYMPTOMS
CONSTIPATION: 0
COUGH: 0
BACK PAIN: 1
EYE PAIN: 0
TROUBLE SWALLOWING: 0
EYE ITCHING: 0
SHORTNESS OF BREATH: 0
ABDOMINAL PAIN: 0
DIARRHEA: 0

## 2021-04-22 NOTE — PROGRESS NOTES
[facility] RHEUMATOLOGY FOLLOW UP NOTE       Date Of Service: 4/22/2021  Provider: Amara Zeng DO    Name: Raya Conroy   MRN: [id number]    Chief Complaint(s)     Chief Complaint   Patient presents with    6 Month Follow-Up        History of Present Illness (HPI)     Raya Conroy  is a(n)55 y.o. female with a hx of fibromyalgia, osteoporosis, GERD, rheumatoid arthritis (+, CCP 24 2012), h/o hemorrhoids, fatty liver, dry eyes, HTN here for the f/u evaluation of rheumatoid arthritis, fibromyalgia. Persistent sensation in th enose. Vaginal rash started ~ 1 year ago - intermittent localied (relief with clotramazoe w/ betamethasone), Rash intermittent neck and base of scalp around jan 1st 2021. treated by PCP as eczmea. left eye with swelling of the eye. Started in oct and treated by patient with tree oil. Rheumatoid arthritis - worsening jointp ain after stopping  humira 40mg q 2 weeks in oct to  February 2021. Ongoing pain in the bilateral hands, left ankles, bilat toes. Pain up to 4/10 over the past weekly. Swelling of the right index finger and mild redness of the Right 2nd  mcp and 4th pip.      low back -worsening. No particular timing. Daily intermittent. aggravated with sitting, standing, laying down, prolonged walking . - Numbness/tingling in fingers and toes. - worse after getting out of a bath. Currently reporting urination all the time / freqeuncy, dysuria/burning with urination, flank pain. Ongoing chest wall pain on the right since gallbladder resection. + fatigue, + dry mouth. + dry mouth     REVIEW OF SYSTEMS: (ROS)    Review of Systems   Constitutional: Positive for fatigue. Negative for fever and unexpected weight change. HENT: Negative for congestion and trouble swallowing. Eyes: Negative for pain and itching. Respiratory: Negative for cough and shortness of breath. Cardiovascular: Positive for leg swelling. Negative for chest pain.    Gastrointestinal: topically 2 times daily Apply topically 2 times daily.  DULoxetine (CYMBALTA) 20 MG extended release capsule TAKE 1 CAPSULE BY MOUTH EVERY DAY  2    Naproxen Sodium (ALEVE) 220 MG CAPS Take 220 mg by mouth daily as needed for Pain      cycloSPORINE (RESTASIS) 0.05 % ophthalmic emulsion 1 drop 2 times daily      predniSONE (DELTASONE) 2.5 MG tablet TAKE ONE HALF TAB BY MOUTH EVERY DAY (Patient not taking: Reported on 4/22/2021) 45 tablet 2    minocycline (DYNACIN) 100 MG tablet TAKE 1 TABLET BY MOUTH TWICE A DAY (Patient not taking: Reported on 10/22/2020) 60 tablet 3    Tofacitinib Citrate (XELJANZ) 5 MG TABS Take 5 mg by mouth 2 times daily (Patient not taking: Reported on 10/22/2020) 60 tablet 1     No current facility-administered medications for this visit. PHYSICAL EXAMINATION / OBJECTIVE   Objective:  /78 (Site: Left Upper Arm, Position: Sitting, Cuff Size: Medium Adult)   Pulse 76   Ht 5' 2.01\" (1.575 m)   Wt 201 lb 6.4 oz (91.4 kg)   SpO2 93%   BMI 36.83 kg/m²     Physical Exam  Vitals signs reviewed. Constitutional:       Appearance: She is well-developed. Neck:      Musculoskeletal: Normal range of motion and neck supple. Cardiovascular:      Rate and Rhythm: Normal rate and regular rhythm. Pulmonary:      Effort: Pulmonary effort is normal.      Breath sounds: Normal breath sounds. Abdominal:      Palpations: Abdomen is soft. Tenderness: There is no abdominal tenderness. Skin:     General: Skin is warm and dry. Findings: No rash. Neurological:      Mental Status: She is alert and oriented to person, place, and time. Deep Tendon Reflexes: Reflexes are normal and symmetric. Psychiatric:         Thought Content: Thought content normal.         Musculoskeletal:     Normal gait. Strength 5/5 in biceps, triceps, hips, knees,      Upper extremities:   Shoulders:  Tender bilateral  posterio traps.    Elbows:      Nontender  Wrists       Tinel & phalen joints no significant synovitis  +RF, +CCP,    - humira 40mg 2 weeks. (April 28, 2020)    - prednisone challenge to evaluate for improvement of worsening pain. Paresthesia - wrists/hand and bilat feet. Worsening   - concern for entrapment neuropathy related to # 1.    - + tinel at bilat ankles. --- ? Tarsal tunnel   - + tinel and phalen --- ? Carpal tunnel - discussed wrist splinting - holding on EMG. Chronic low back pain w/ bilateral radicular sx's   - MRI low back revealed degenerative arthritis but no other significant abnormalities   - x-ray lumbar spine ordered. Frequency of urination , dysuria, with flank pain - negative CVA tenderness    - ? Back related, evaluation for UTI      Fibromyalgia - cont. Pain and worsneing fatigue.   - prior tx: gabapentin, flexeril    - cymbalta 20 mg daily (June 2019) --- declined titration. - encourage patient to continue exercise    Medication monitoring   - CBC, CMP, sed rate, CRP q 4-6 months w/ Humira    - TB gold negative (April 2019)            Electronically signed by Rey Farnsworth DO on 4/22/2021 at 3:26 PM    New Prescriptions    No medications on file       Thank you for allowing me to participate in the care of this patient. Please call if there are any questions.

## 2021-04-22 NOTE — RESULT ENCOUNTER NOTE
X-ray of the back revealed moderate degenerative arthritis in the lower lumbar spine. No other significant abnormalities were appreciated.

## 2021-08-03 LAB
ALBUMIN SERPL-MCNC: 3.9 G/DL
ALP BLD-CCNC: 74 U/L
ALT SERPL-CCNC: 34 U/L
ANION GAP SERPL CALCULATED.3IONS-SCNC: 13 MMOL/L
AST SERPL-CCNC: 23 U/L
BASOPHILS ABSOLUTE: 0.02 /ΜL
BASOPHILS RELATIVE PERCENT: 0.3 %
BILIRUB SERPL-MCNC: 0.4 MG/DL (ref 0.1–1.4)
BUN BLDV-MCNC: 13 MG/DL
C-REACTIVE PROTEIN: 1.8
CALCIUM SERPL-MCNC: 9.1 MG/DL
CHLORIDE BLD-SCNC: 103 MMOL/L
CO2: 26 MMOL/L
CREAT SERPL-MCNC: 0.7 MG/DL
EOSINOPHILS ABSOLUTE: 0.2 /ΜL
EOSINOPHILS RELATIVE PERCENT: 3.3 %
GFR CALCULATED: NORMAL
GLUCOSE BLD-MCNC: 93 MG/DL
HCT VFR BLD CALC: 36.3 % (ref 36–46)
HEMOGLOBIN: 12 G/DL (ref 12–16)
LYMPHOCYTES ABSOLUTE: 2.24 /ΜL
LYMPHOCYTES RELATIVE PERCENT: 37.4 %
MCH RBC QN AUTO: 28.8 PG
MCHC RBC AUTO-ENTMCNC: 33.1 G/DL
MCV RBC AUTO: 87.3 FL
MONOCYTES ABSOLUTE: 0.54 /ΜL
MONOCYTES RELATIVE PERCENT: 9 %
NEUTROPHILS ABSOLUTE: 2.97 /ΜL
NEUTROPHILS RELATIVE PERCENT: 49.7 %
PDW BLD-RTO: 40.1 %
PLATELET # BLD: 221 K/ΜL
PMV BLD AUTO: 11 FL
POTASSIUM SERPL-SCNC: 3.8 MMOL/L
RBC # BLD: 4.16 10^6/ΜL
SEDIMENTATION RATE, ERYTHROCYTE: 22
SODIUM BLD-SCNC: 138 MMOL/L
TOTAL PROTEIN: 7.3
WBC # BLD: 5.99 10^3/ML

## 2021-08-26 ENCOUNTER — OFFICE VISIT (OUTPATIENT)
Dept: RHEUMATOLOGY | Age: 56
End: 2021-08-26
Payer: COMMERCIAL

## 2021-08-26 VITALS
SYSTOLIC BLOOD PRESSURE: 128 MMHG | OXYGEN SATURATION: 98 % | HEART RATE: 80 BPM | HEIGHT: 62 IN | DIASTOLIC BLOOD PRESSURE: 80 MMHG | BODY MASS INDEX: 35.88 KG/M2 | WEIGHT: 195 LBS

## 2021-08-26 DIAGNOSIS — M79.7 FIBROMYALGIA: ICD-10-CM

## 2021-08-26 DIAGNOSIS — G89.29 CHRONIC MIDLINE LOW BACK PAIN WITH BILATERAL SCIATICA: ICD-10-CM

## 2021-08-26 DIAGNOSIS — M54.42 CHRONIC MIDLINE LOW BACK PAIN WITH BILATERAL SCIATICA: ICD-10-CM

## 2021-08-26 DIAGNOSIS — M05.79 RHEUMATOID ARTHRITIS INVOLVING MULTIPLE SITES WITH POSITIVE RHEUMATOID FACTOR (HCC): Primary | ICD-10-CM

## 2021-08-26 DIAGNOSIS — M54.41 CHRONIC MIDLINE LOW BACK PAIN WITH BILATERAL SCIATICA: ICD-10-CM

## 2021-08-26 DIAGNOSIS — Z51.81 MEDICATION MONITORING ENCOUNTER: ICD-10-CM

## 2021-08-26 PROCEDURE — 99214 OFFICE O/P EST MOD 30 MIN: CPT | Performed by: INTERNAL MEDICINE

## 2021-08-26 RX ORDER — LOSARTAN POTASSIUM 50 MG/1
100 TABLET ORAL DAILY
COMMUNITY
Start: 2021-08-18

## 2021-08-26 RX ORDER — ESTRADIOL 0.1 MG/G
1 CREAM VAGINAL WEEKLY
COMMUNITY
Start: 2021-08-23

## 2021-08-26 RX ORDER — ABATACEPT 125 MG/ML
125 INJECTION, SOLUTION SUBCUTANEOUS WEEKLY
Qty: 4 ML | Refills: 5 | Status: SHIPPED | OUTPATIENT
Start: 2021-08-26 | End: 2022-02-16

## 2021-08-26 ASSESSMENT — ENCOUNTER SYMPTOMS
SHORTNESS OF BREATH: 0
BACK PAIN: 1
COUGH: 0
EYES NEGATIVE: 1
EYE PAIN: 0
WHEEZING: 0
DIARRHEA: 0
VOMITING: 0
EYE REDNESS: 0
CONSTIPATION: 0
NAUSEA: 0

## 2021-08-26 NOTE — PROGRESS NOTES
3    PHYSICAL EXAMINATION / OBJECTIVE     Objective:  /80 (Site: Left Lower Arm, Position: Sitting, Cuff Size: Medium Adult)   Pulse 80   Ht 5' 2.01\" (1.575 m)   Wt 195 lb (88.5 kg)   SpO2 98%   BMI 35.66 kg/m²     Physical Exam  general Appearance:  AAO x 3 ,  well-developed and well nourished  Head: NCAT  Eyes: No abnormalities. ,  Sclera non-icteric,   Ears / Nose:  normal  appearance  ears and nose. No active drainage   Mouth:  MMM, ears w/o deformities  Neck: No jugular venous distention, appears symmetric, good ROM  Lymph:  no cervical adenopathy    Pulmonary/Chest: CTA bilateral ,  symmetric chest expansion. Cardiovascular: Normal S1 and S2, NO murmur, rub, gallop  : Deferred   Abd/GI: Deferred   Neurologic: Speech normal, no facial droop,  Skin: NO rash on exposed skin. Musculoskeletal:  Upper extremities:      ELBOWS tender bilat  WRISTS nt  HANDS/FINGERS : PIPs\" tender & swelling  right 3,4 , left 4th. Lower extremities:  KNEES tender bilat. ANKLES Non-tender,     FEET : tender bilat MTPs. Spine:   tender lumbosacral spine. Exam KEY:   Tender :  T    Swelling: S,   Deformities: D,   Non-tender : NT  ,  No swelling: NS         RAPID 3  8/26/2021 --- RAPID 3:  3.3 + 5 + 5 = 13.3        Remission: <3  Low Disease Activity: <6  Moderate Disease Activity: >=6 and <=12  High Disease Activity: >12     LABS      Lab Results   Component Value Date    WBC 5.99 08/03/2021    HGB 12.0 08/03/2021    MCV 87.3 08/03/2021    MCHC 33.1 08/03/2021    RDW 40.1 08/03/2021     08/03/2021    NEUTROABS 2.97 08/03/2021    LYMPHSABS 2.24 08/03/2021    EOSABS 0.20 08/03/2021    BASOSABS 0.02 08/03/2021       Chemistry        Component Value Date/Time     08/03/2021 0000    K 3.8 08/03/2021 0000     08/03/2021 0000    CO2 26 08/03/2021 0000    BUN 13 08/03/2021 0000    CREATININE 0.7 08/03/2021 0000        Component Value Date/Time    CALCIUM 9.1 08/03/2021 0000    ALKPHOS 74 08/03/2021 0000    AST 23 08/03/2021 0000    ALT 34 08/03/2021 0000    BILITOT 0.4 08/03/2021 0000            Lab Results   Component Value Date    SEDRATE 22 08/03/2021    SEDRATE 18 04/19/2021    SEDRATE 14 01/04/2021    CRP 1.8 08/03/2021    CRP 0.9 04/19/2021    CRP 0.9 01/04/2021     Lab Results   Component Value Date    .5 07/23/2018           RADIOLOGY / PROCEDURES:     ASSESSMENT/PLAN:     1. Rheumatoid arthritis involving multiple sites with positive rheumatoid factor ([facility] [address]    2. Chronic midline low back pain with bilateral sciatica    3. Fibromyalgia    4. Medication monitoring encounter           - Gabapentin (improved sleep), plaquenil (auditory changes), arava 10 mg (RUQ abd pain), MTX (fatigue, GI upset), imuran (RUQ pain, sept 2018), enbrel (rash, June- aug 2019), remicaide (insurance issues), prednisone (sig relief), Xeljanz 5 mg BID (abdominal pain, GI upset), cervical facet joint injections (significant relief.)  Minocycline 100 mg BD (off label use for RA) per pt request.      Rheumatoid arthritis - cont tender joints. -Active tender joints no significant synovitis +RF, +CCP,   -- d/c humira 40mg 2 weeks. (4/28/2020) b/c ? intolerance & medication monitoring   -- start orencia 125mg subcu weekly --- 8/26/2021  --- discussed the imporance of medicaiton complaicne and not stopping the medications unless there are side effects. She is to call the office if there is symptoms that develop. - CTLA 4 inhibition -- prevention of T-cell activation by prevention of costimulatory signal needed for T-cell activation. -- side effects of Orencia and include but are not limited to malignancy, serious infections, sepsis, pneumonia, allergic reaction (anaphylaxis Saenz-Brain's), infusion reactions: Multiple sclerosis, nausea, cough, high blood pressure, diarrhea, fever, abdominal pain, rash, injection site reactions, worsening of COPD. --- declined rinvoq, baracitinib, discussed rtx. Paresthesia - wrists/hand and bilat feet. active. - concern for entrapment neuropathy related to # 1. Chronic low back pain w/ bilateral radicular sx's  - MRI low back revealed degenerative arthritis but no other significant abnormalities  - declined gabapentin. Frequency of urination , dysuria, with flank pain - negative CVA tenderness                - ? Back related, evaluation for UTI       Fibromyalgia - cont. Pain and worsneing fatigue.   - prior tx: gabapentin, flexeril                      - cymbalta 20 mg daily (June 2019) --- declined titration. - encourage patient to continue exercise -- not currently exercising.    - declined gabapentin. Medication monitoring               - CBC, CMP, sed rate, CRP q 4-6 months w/ Humira                - TB gold negative (April 2019)       Return in about 4 months (around 12/26/2021). New Prescriptions    ABATACEPT (ORENCIA CLICKJECT) 620 MG/ML SOAJ    Inject 125 mg into the skin once a week       8/26/2021    Electronically signed by Marcelo Gay,  on 8/26/21 at 3:16 PM EDT  Please contact the office if you have any questions or change of symptoms.

## 2021-12-23 LAB
ALBUMIN SERPL-MCNC: 3.6 G/DL
ALP BLD-CCNC: 116 U/L
ALT SERPL-CCNC: 46 U/L
ANION GAP SERPL CALCULATED.3IONS-SCNC: 12 MMOL/L
AST SERPL-CCNC: 28 U/L
BASOPHILS ABSOLUTE: 0.01 /ΜL
BASOPHILS RELATIVE PERCENT: 0.1 %
BILIRUB SERPL-MCNC: 0.3 MG/DL (ref 0.1–1.4)
BUN BLDV-MCNC: 14 MG/DL
C-REACTIVE PROTEIN: 8.2
CALCIUM SERPL-MCNC: 8.7 MG/DL
CHLORIDE BLD-SCNC: 105 MMOL/L
CO2: 28 MMOL/L
CREAT SERPL-MCNC: 0.8 MG/DL
EOSINOPHILS ABSOLUTE: 0.2 /ΜL
EOSINOPHILS RELATIVE PERCENT: 2.8 %
GFR CALCULATED: 78
GLUCOSE BLD-MCNC: 115 MG/DL
HCT VFR BLD CALC: 36.3 % (ref 36–46)
HEMOGLOBIN: 11.7 G/DL (ref 12–16)
LYMPHOCYTES ABSOLUTE: 1.92 /ΜL
LYMPHOCYTES RELATIVE PERCENT: 26.7 %
MCH RBC QN AUTO: 28.5 PG
MCHC RBC AUTO-ENTMCNC: 32.2 G/DL
MCV RBC AUTO: 88.5 FL
MONOCYTES ABSOLUTE: 0.64 /ΜL
MONOCYTES RELATIVE PERCENT: 8.9 %
NEUTROPHILS ABSOLUTE: 4.4 /ΜL
NEUTROPHILS RELATIVE PERCENT: 61.2 %
PDW BLD-RTO: 43.8 %
PLATELET # BLD: 229 K/ΜL
PMV BLD AUTO: 10.7 FL
POTASSIUM SERPL-SCNC: 3.9 MMOL/L
RBC # BLD: 4.1 10^6/ΜL
SEDIMENTATION RATE, ERYTHROCYTE: 25
SODIUM BLD-SCNC: 141 MMOL/L
TOTAL PROTEIN: 7.1
WBC # BLD: 7.19 10^3/ML

## 2021-12-30 ENCOUNTER — OFFICE VISIT (OUTPATIENT)
Dept: RHEUMATOLOGY | Age: 56
End: 2021-12-30
Payer: COMMERCIAL

## 2021-12-30 VITALS
SYSTOLIC BLOOD PRESSURE: 122 MMHG | HEIGHT: 62 IN | WEIGHT: 195 LBS | DIASTOLIC BLOOD PRESSURE: 78 MMHG | OXYGEN SATURATION: 98 % | BODY MASS INDEX: 35.88 KG/M2 | HEART RATE: 76 BPM

## 2021-12-30 DIAGNOSIS — R21 RASH IN ADULT: ICD-10-CM

## 2021-12-30 DIAGNOSIS — Z51.81 MEDICATION MONITORING ENCOUNTER: ICD-10-CM

## 2021-12-30 DIAGNOSIS — M54.42 CHRONIC MIDLINE LOW BACK PAIN WITH BILATERAL SCIATICA: ICD-10-CM

## 2021-12-30 DIAGNOSIS — M54.41 CHRONIC MIDLINE LOW BACK PAIN WITH BILATERAL SCIATICA: ICD-10-CM

## 2021-12-30 DIAGNOSIS — G89.29 CHRONIC MIDLINE LOW BACK PAIN WITH BILATERAL SCIATICA: ICD-10-CM

## 2021-12-30 DIAGNOSIS — M05.79 RHEUMATOID ARTHRITIS INVOLVING MULTIPLE SITES WITH POSITIVE RHEUMATOID FACTOR (HCC): Primary | ICD-10-CM

## 2021-12-30 DIAGNOSIS — M79.7 FIBROMYALGIA: ICD-10-CM

## 2021-12-30 PROCEDURE — 99214 OFFICE O/P EST MOD 30 MIN: CPT | Performed by: INTERNAL MEDICINE

## 2021-12-30 ASSESSMENT — ENCOUNTER SYMPTOMS
VOMITING: 0
WHEEZING: 0
COUGH: 0
SHORTNESS OF BREATH: 0
NAUSEA: 0
EYE REDNESS: 0
EYE PAIN: 0
DIARRHEA: 0
CONSTIPATION: 0
EYES NEGATIVE: 1

## 2021-12-30 NOTE — PATIENT INSTRUCTIONS
medication label. Some forms of coal tar may be applied 1 to 4 times per day. To use coal tar bath oil, pour 1 to 3 capfuls into a warm bath before bathing. The oil can make the bathtub slippery. Take care to avoid a fall. Shake the coal tar shampoo well just before each use. Use enough shampoo to create a rich lather. Massage the shampoo into your scalp and rinse thoroughly. Apply the shampoo a second time and leave it on your scalp for 5 minutes. Rinse thoroughly. Coal tar shampoo may discolor blond or colored hair. This effect is usually temporary. Do not use coal tar to treat large skin areas. Do not use coal tar over long periods of time without your doctor's advice. Some forms of coal tar can stain fabric or other surfaces. Call your doctor if your symptoms do not improve, or if they get worse while using coal tar topical.  Store at room temperature away from moisture and heat. Keep the medicine tightly closed when not in use. What happens if I miss a dose? Since coal tar topical is used when needed, you may not be on a dosing schedule. If you are on a schedule, use the missed dose as soon as you remember. Skip the missed dose if it is almost time for your next scheduled dose. Do not use extra medicine to make up the missed dose. What happens if I overdose? An overdose of coal tar topical is not expected to be dangerous. Seek emergency medical attention or call the Poison Help line at 1-628.648.9595 if anyone has accidentally swallowed the medication. What should I avoid while using coal tar? Do not use coal tar together with other psoriasis medications unless your doctor tells you to. Avoid getting coal tar topical in your eyes. If this does occur, rinse with water. Do not use coal tar to treat the skin of your groin or rectal area. Avoid exposure to sunlight or artificial UV rays (sunlamps or tanning beds). Coal tar can make your skin more sensitive to sunlight and sunburn may result.   What are the possible side effects of coal tar? Get emergency medical help if you have signs of an allergic reaction:  hives; difficult breathing; swelling of your face, lips, tongue, or throat. Stop using this medicine and call your doctor at once if you have:  · severe stinging, burning, swelling, or other irritation of the treated skin. Common side effects may include mild skin irritation or skin rash. This is not a complete list of side effects and others may occur. Call your doctor for medical advice about side effects. You may report side effects to FDA at 5-156-FDA-6705. What other drugs will affect coal tar? It is not likely that other drugs you take orally or inject will have an effect on topically applied coal tar. But many drugs can interact with each other. Tell each of your health care providers about all medicines you use, including prescription and over-the-counter medicines, vitamins, and herbal products. Where can I get more information? Your pharmacist can provide more information about coal tar topical.  Remember, keep this and all other medicines out of the reach of children, never share your medicines with others, and use this medication only for the indication prescribed. Every effort has been made to ensure that the information provided by [address] is accurate, up-to-date, and complete, but no guarantee is made to that effect. Drug information contained herein may be time sensitive. [facility] information has been compiled for use by healthcare practitioners and consumers in the [place] and therefore [facility] does not warrant that uses outside of the [place] are appropriate, unless specifically indicated otherwise. [facility] information does not endorse drugs, diagnose patients or recommend therapy.  [facility] drug information is an informational resource designed to assist licensed healthcare practitioners in caring for their patients and/or to serve consumers viewing this service as a supplement to, and not a substitute for, the expertise, skill, knowledge and judgment of healthcare practitioners. The absence of a warning for a given drug or drug combination in no way should be construed to indicate that the drug or drug combination is safe, effective or appropriate for any given patient. [facility] does not assume any responsibility for any aspect of healthcare administered with the aid of information [facility] provides. The information contained herein is not intended to cover all possible uses, directions, precautions, warnings, drug interactions, allergic reactions, or adverse effects. If you have questions about the drugs you are taking, check with your doctor, nurse or pharmacist.  Copyright 9946-0795 [address]. Version: 1.07. Revision date: 3/15/2017. Care instructions adapted under license by [facility] [place] ([facility]). If you have questions about a medical condition or this instruction, always ask your healthcare professional. [place] [address] any warranty or liability for your use of this information.

## 2021-12-30 NOTE — PROGRESS NOTES
[facility] RHEUMATOLOGY FOLLOW UP NOTE     Date Of Service: 12/30/2021  Provider: Bonnie Henderson DO , DO  PCP: Adryan Dewey MD   Name: Lizbeth Fallon   MRN: [id number]    ASSESSMENT/PLAN:   1. Rheumatoid arthritis involving multiple sites with positive rheumatoid factor ([facility] [address]  2. Fibromyalgia  3. Chronic midline low back pain with bilateral sciatica  4. Medication monitoring encounter  5. Rash in adult     Rheumatoid arthritis - cont tender joints. -Active tender joints no significant synovitis +RF, +CCP,   - Gabapentin (improved sleep), plaquenil (auditory changes), arava 10 mg (RUQ abd pain), MTX (fatigue, GI upset), imuran (RUQ pain, sept 2018), enbrel (rash, June- aug 2019), remicaide (insurance issues), prednisone (sig relief), Xeljanz 5 mg BID (abdominal pain, GI upset), cervical facet joint injections (significant relief.)  Minocycline 100 mg BD (off label use for RA) per pt request.  humira 40mg 2 weeks. (4/28/2020 - sept 2021) b/c ? Intolerance. -- restart orencia 125mg subcu weekly --- Sept to oct 2021   -- declined rinvoq, baracitinib, discussed rtx. Paresthesia -mildly in the feet - ?  entrapment neuropathy related to # 1. Chronic low back pain w/ bilateral radicular sx's  - MRI low back revealed degenerative arthritis but no other significant abnormalities    Rash- plaques in the occipital region - patient was going to see her dermatologist - ? Psoriasis  Vs other          - discussed trial of coal tar shampoo    Fibromyalgia - cont. Pain and worsneing fatigue.   - prior tx: gabapentin, flexeril                      - cymbalta 20 mg daily (June 2019) --- declined titration. - encourage patient to continue exercise -- not currently exercising. Medication monitoring               - CBC, CMP, sed rate, CRP q 4-6 months                - TB gold negative (April 2019)   - request results from [facility]       Return in about 3 months (around 3/30/2022).   New Prescriptions    No medications on file     History of Present Illness (HPI)     Chief Complaint   Patient presents with    Follow-up     4 month f/u SIOMARA Mehta  is a(n)56 y.o. female with a hx of hx offibromyalgia, osteoporosis, GERD, rheumatoid arthritis (+, CCP 24 2012), h/o hemorrhoids, fatty liver, dry eyes, HTN here for the f/u evaluation of rheumatoid arthritis, fibromyalgia. , chronic low back pain w/ radiculopathy. covid infxn nov 17, 2021 -- increased chest pain jaw pain, burning sensation in mouth - Recieved Regeneron nov 19th ,2021. Rheumatoid arthritis- stopped orencia oct to current w/ increased pain when on th emedication was having decreased pain. Pain bilat fingers, wrists, left hip, left knees, bilateral ankles, bilateral toes, neck and lower back. Pain 7.5/10 over the past week. AM stiffness ~ 10 min. + anxiety. Low back pain with radicular symptoms ongoing and intermittent. aggravated with sitting, standing, laying down, prolonged walking . - Numbness/tingling in fingers, + cramping of the feet and legs (left > right) mainly in the evenings. Fibromyalgia- cymbalta 20mg daily      Rash in the scalp with crusting has continued      + fatigue, + dry mouth. + dry mouth     REVIEW OF SYSTEMS: (ROS)    Review of Systems   Constitutional: Positive for fatigue. Negative for diaphoresis and fever. HENT: Negative for congestion, hearing loss and nosebleeds. Eyes: Negative. Negative for pain and redness. Respiratory: Negative for cough, shortness of breath and wheezing. Cardiovascular: Negative. Negative for chest pain. Gastrointestinal: Negative for constipation, diarrhea, nausea and vomiting. Genitourinary: Negative for difficulty urinating, frequency and hematuria. Musculoskeletal: Positive for myalgias. Skin: Negative for rash. Neurological: Negative for dizziness, weakness and headaches. Hematological: Does not bruise/bleed easily. Psychiatric/Behavioral: Negative for sleep disturbance. The patient is not nervous/anxious. PmHx:  has a past medical history of Fatty liver, Fibromyalgia, GERD (gastroesophageal reflux disease), and Osteoporosis. Social History:  reports that she has never smoked. She has never used smokeless tobacco. She reports that she does not drink alcohol and does not use drugs. ALLERGIES     Allergies   Allergen Reactions    Tape Svitlana Fortis Tape]     Sulfa Antibiotics Nausea And Vomiting       CURRENT MEDICATIONS      Current Outpatient Medications:     estradiol (ESTRACE) 0.1 MG/GM vaginal cream, Place 1 g vaginally once a week, Disp: , Rfl:     losartan (COZAAR) 50 MG tablet, Take 1 tablet by mouth daily, Disp: , Rfl:     Abatacept (ORENCIA CLICKJECT) 909 MG/ML SOAJ, Inject 125 mg into the skin once a week, Disp: 4 mL, Rfl: 5    omeprazole (PRILOSEC) 40 MG delayed release capsule, TAKE 1 CAPSULE BY MOUTH EVERY DAY, Disp: , Rfl:     clotrimazole-betamethasone (LOTRISONE) 1-0.05 % cream, Apply topically 2 times daily Apply topically 2 times daily. , Disp: , Rfl:     DULoxetine (CYMBALTA) 20 MG extended release capsule, TAKE 1 CAPSULE BY MOUTH EVERY DAY, Disp: , Rfl: 2    Naproxen Sodium (ALEVE) 220 MG CAPS, Take 220 mg by mouth daily as needed for Pain, Disp: , Rfl:     cycloSPORINE (RESTASIS) 0.05 % ophthalmic emulsion, 1 drop 2 times daily, Disp: , Rfl:     PHYSICAL EXAMINATION / OBJECTIVE     Objective:  /78 (Site: Left Lower Arm, Position: Sitting, Cuff Size: Medium Adult)   Pulse 76   Ht 5' 2.01\" (1.575 m)   Wt 195 lb (88.5 kg)   SpO2 98%   BMI 35.66 kg/m²     Physical Exam    General Appearance: General appearance:  AAO x 3 ,  well-developed and well nourished  Head: NCAT  Eyes: No abnormalities. ,  Sclera non-icteric,   Ears / Nose:  normal  appearance  ears and nose. No active drainage from nose.    Mouth:  MMM, ears w/o deformities  Neck: No jugular venous distention, appears symmetric, good ROM  Lymph: no cervical adenopathy   Pulmonary/Chest: CTA bilateral ,  symmetric chest expansion. Cardiovascular: Normal S1 and S2, NO murmur, rub, gallop  : Deferred   Abd/GI: Deferred   Neurologic: Speech normal, no facial droop,  Skin: NO rash on exposed skin. Musculoskeletal:  Upper extremities:  S no synovitis or tenderness appreciated. Lower extremities: Tender bilateral MTPs. Spine: C-spine, T-spine & L-spine: Tender lumbosacral spine. MDHAQ:   12/30/2021 --- rapid 3: Please see scanned MDHAQ in media section of chart     LABS      Lab Results   Component Value Date    WBC 5.99 08/03/2021    HGB 12.0 08/03/2021    MCV 87.3 08/03/2021    MCHC 33.1 08/03/2021    RDW 40.1 08/03/2021     08/03/2021    NEUTROABS 2.97 08/03/2021    LYMPHSABS 2.24 08/03/2021    EOSABS 0.20 08/03/2021    BASOSABS 0.02 08/03/2021         Chemistry        Component Value Date/Time     08/03/2021 0000    K 3.8 08/03/2021 0000     08/03/2021 0000    CO2 26 08/03/2021 0000    BUN 13 08/03/2021 0000    CREATININE 0.7 08/03/2021 0000        Component Value Date/Time    CALCIUM 9.1 08/03/2021 0000    ALKPHOS 74 08/03/2021 0000    AST 23 08/03/2021 0000    ALT 34 08/03/2021 0000    BILITOT 0.4 08/03/2021 0000            Lab Results   Component Value Date    SEDRATE 22 08/03/2021    SEDRATE 18 04/19/2021    SEDRATE 14 01/04/2021    CRP 1.8 08/03/2021    CRP 0.9 04/19/2021    CRP 0.9 01/04/2021     Lab Results   Component Value Date    .5 07/23/2018           RADIOLOGY / PROCEDURES:                 Electronically signed by Milvia Niño DO on 12/30/21 at 3:30 PM EST  Please contact the office if you have any questions or change of symptoms.

## 2022-02-16 DIAGNOSIS — M05.79 RHEUMATOID ARTHRITIS INVOLVING MULTIPLE SITES WITH POSITIVE RHEUMATOID FACTOR (HCC): ICD-10-CM

## 2022-02-16 RX ORDER — ABATACEPT 125 MG/ML
INJECTION, SOLUTION SUBCUTANEOUS
Qty: 4 ML | Refills: 6 | Status: SHIPPED | OUTPATIENT
Start: 2022-02-16

## 2022-03-31 ENCOUNTER — OFFICE VISIT (OUTPATIENT)
Dept: RHEUMATOLOGY | Age: 57
End: 2022-03-31
Payer: COMMERCIAL

## 2022-03-31 VITALS
DIASTOLIC BLOOD PRESSURE: 88 MMHG | SYSTOLIC BLOOD PRESSURE: 134 MMHG | WEIGHT: 202 LBS | OXYGEN SATURATION: 97 % | HEIGHT: 62 IN | BODY MASS INDEX: 37.17 KG/M2 | HEART RATE: 82 BPM

## 2022-03-31 DIAGNOSIS — M54.42 CHRONIC MIDLINE LOW BACK PAIN WITH BILATERAL SCIATICA: ICD-10-CM

## 2022-03-31 DIAGNOSIS — Z51.81 MEDICATION MONITORING ENCOUNTER: ICD-10-CM

## 2022-03-31 DIAGNOSIS — M79.7 FIBROMYALGIA: ICD-10-CM

## 2022-03-31 DIAGNOSIS — M54.41 CHRONIC MIDLINE LOW BACK PAIN WITH BILATERAL SCIATICA: ICD-10-CM

## 2022-03-31 DIAGNOSIS — R21 RASH IN ADULT: ICD-10-CM

## 2022-03-31 DIAGNOSIS — G89.29 CHRONIC MIDLINE LOW BACK PAIN WITH BILATERAL SCIATICA: ICD-10-CM

## 2022-03-31 DIAGNOSIS — M05.79 RHEUMATOID ARTHRITIS INVOLVING MULTIPLE SITES WITH POSITIVE RHEUMATOID FACTOR (HCC): Primary | ICD-10-CM

## 2022-03-31 PROCEDURE — 99214 OFFICE O/P EST MOD 30 MIN: CPT | Performed by: NURSE PRACTITIONER

## 2022-03-31 ASSESSMENT — ENCOUNTER SYMPTOMS
COUGH: 0
CONSTIPATION: 0
EYE PAIN: 0
EYE ITCHING: 0
DIARRHEA: 0
TROUBLE SWALLOWING: 0
ABDOMINAL PAIN: 0
SHORTNESS OF BREATH: 0
BACK PAIN: 1
NAUSEA: 1

## 2022-03-31 NOTE — PROGRESS NOTES
[facility] RHEUMATOLOGY FOLLOW UP NOTE       Date Of Service: 3/31/2022  Provider: XIANG Gayle - CNP    Name: Robin Doshi   MRN: [id number]    Chief Complaint(s)     Chief Complaint   Patient presents with    Follow-up     3 month follow up        History of Present Illness (HPI)     Robin Doshi  is a(n)56 y.o. female with a hx of fibromyalgia, osteoporosis, GERD, rheumatoid arthritis, h/o hemorrhoids, fatty liver, dry eyes, HTN here for the f/u evaluation of rheumatoid arthritis, fibromyalgia, chronic low back pain w/ radiculopathy. Interval hx:    - Had COVID again in february   - restarted orencia about 4 weeks ago    pain affecting the fingers, wrists, elbows, shoulders, ankles, neck, back  Pain on a scale 0-10: 8/10  Type of pain: intermittent  Timing:none  Aggravating factors: back: sitting, standing, laying down    Associated symptoms:  + swelling/  Redness/ warmth (hands), + AM stiffness lasting ~ 10 minutes    REVIEW OF SYSTEMS: (ROS)    Review of Systems   Constitutional: Positive for fatigue. Negative for fever and unexpected weight change. HENT: Negative for congestion and trouble swallowing. Dry mouth   Eyes: Negative for pain and itching. Dry eyes   Respiratory: Negative for cough and shortness of breath. Cardiovascular: Negative for chest pain and leg swelling. Gastrointestinal: Positive for nausea. Negative for abdominal pain, constipation and diarrhea. Endocrine: Negative for cold intolerance and heat intolerance. Genitourinary: Negative for difficulty urinating, frequency and urgency. Musculoskeletal: Positive for arthralgias, back pain, myalgias and neck pain. Negative for joint swelling. Skin: Positive for rash. Neurological: Positive for dizziness and weakness. Negative for numbness and headaches. Psychiatric/Behavioral: The patient is nervous/anxious.         PAST MEDICAL HISTORY      Past Medical History:   Diagnosis Date    Fatty liver 2018    Fibromyalgia     GERD (gastroesophageal reflux disease)     Osteoporosis        PAST SURGICAL HISTORY      Past Surgical History:   Procedure Laterality Date    CATARACT REMOVAL  2014     SECTION  8625,8212,2378    CHOLECYSTECTOMY  2016    COLONOSCOPY  2014    DILATION AND CURETTAGE OF UTERUS  1981    UPPER GASTROINTESTINAL ENDOSCOPY  2016       FAMILY HISTORY      Family History   Problem Relation Age of Onset    Lupus Mother     Arthritis Mother         MCTD / lupus    Rheum Arthritis Maternal Cousin     Rheum Arthritis Paternal Cousin     Rheum Arthritis Daughter        SOCIAL HISTORY      Social History     Tobacco History     Smoking Status  Never Smoker    Smokeless Tobacco Use  Never Used          Alcohol History     Alcohol Use Status  No          Drug Use     Drug Use Status  No          Sexual Activity     Sexually Active  Not Asked                ALLERGIES     Allergies   Allergen Reactions    Tape Garnell  Tape]     Sulfa Antibiotics Nausea And Vomiting       CURRENT MEDICATIONS      Current Outpatient Medications   Medication Sig Dispense Refill    ORENCIA CLICKJECT 642 MG/ML SOAJ INJECT ONE CLICKJECT PEN SUBCUTANEOUSLY EVERY WEEK. REFRIGERATE. ALLOW TO WARM TO ROOM TEMPERATURE PRIOR TO ADMINISTRATION. 4 mL 6    estradiol (ESTRACE) 0.1 MG/GM vaginal cream Place 1 g vaginally once a week      losartan (COZAAR) 50 MG tablet Take 1 tablet by mouth daily      omeprazole (PRILOSEC) 40 MG delayed release capsule TAKE 1 CAPSULE BY MOUTH EVERY DAY      clotrimazole-betamethasone (LOTRISONE) 1-0.05 % cream Apply topically 2 times daily Apply topically 2 times daily.       DULoxetine (CYMBALTA) 20 MG extended release capsule TAKE 1 CAPSULE BY MOUTH EVERY DAY  2    Naproxen Sodium (ALEVE) 220 MG CAPS Take 220 mg by mouth daily as needed for Pain      cycloSPORINE (RESTASIS) 0.05 % ophthalmic emulsion 1 drop 2 times daily       No current facility-administered medications for this visit. PHYSICAL EXAMINATION / OBJECTIVE   Objective:  /88 (Site: Left Lower Arm, Position: Sitting, Cuff Size: Medium Adult)   Pulse 82   Ht 5' 2.01\" (1.575 m)   Wt 202 lb (91.6 kg)   SpO2 97%   BMI 36.94 kg/m²     Physical Exam  Vitals reviewed. Constitutional:       Appearance: She is well-developed. Cardiovascular:      Rate and Rhythm: Normal rate and regular rhythm. Pulmonary:      Effort: Pulmonary effort is normal.      Breath sounds: Normal breath sounds. Musculoskeletal:      Cervical back: Normal range of motion and neck supple. Skin:     General: Skin is warm and dry. Findings: No rash. Neurological:      Mental Status: She is alert and oriented to person, place, and time. Psychiatric:         Thought Content:  Thought content normal.       Upper extremities:    SHOULDERS tender bilat ,   ELBOWS nontender,   WRISTS tender bilat,   HANDS/FINGERS nontender    Lower extremities:  HIPS nontender  KNEES nontender  ANKLES nontender   FEET : tender bilat     Spine:   Tender lumbosacral spine      RAPID 3:   3/31/2022 --- RAPID 3: 3.3 + 8 + 7 = 18.3     Remission: <3  Low Disease Activity: <6  Moderate Disease Activity: >=6 and <=12  High Disease Activity: >12     LABS    CBC  Lab Results   Component Value Date    WBC 7.19 12/23/2021    RBC 4.10 12/23/2021    HGB 11.7 12/23/2021    HCT 36.3 12/23/2021    MCV 88.5 12/23/2021    MCH 28.5 12/23/2021    MCHC 32.2 12/23/2021    RDW 43.8 12/23/2021     12/23/2021       CMP  Lab Results   Component Value Date    CALCIUM 8.7 12/23/2021    LABALBU 3.6 12/23/2021    PROT 7.3 01/20/2020     12/23/2021    K 3.9 12/23/2021    CO2 28 12/23/2021     12/23/2021    BUN 14 12/23/2021    CREATININE 0.8 12/23/2021    ALKPHOS 116 12/23/2021    ALT 46 12/23/2021    AST 28 12/23/2021       HgBA1c: No components found for: HGBA1C    No results found for: VITD25      No results found for: ANASCRN  No results found for: SSA  No results found for: SSB  No results found for: ANTI-SMITH  No results found for: DSDNAAB   No results found for: ANTIRNP  No results found for: C3, C4  No results found for: CCPAB  Lab Results   Component Value Date    .5 07/23/2018       No components found for: Orvan Aid Results   Component Value Date    SEDRATE 25 12/23/2021     Lab Results   Component Value Date    CRP 8.2 12/23/2021       RADIOLOGY:         ASSESSMENT/PLAN    Assessment   Plan     Rheumatoid arthritis- +RF, + CCP  - prior tx: gabapentin (improved sleep), plaquenil (auditory changes), arava (RUQ pain), methotrexate (fatigue, GI upset), imuran (RUQ pain), enbrel (rash), remicaide (insurance issues), prednisone (sig relief), xeljanz (abd pain, GI upset), minocycline, humira (? Intolerance)   - orencia 125 mg subcu weekly (sept- oct 2021, restarted 3/2022)    Paresthesia feet: mild, intermittent    Chronic low back pain w/ bilateral radicular sx's  - MRI lumbar spine w/ degenerative arthritis    Rash- plaques in occipital region   - awaiting dermatology evaluation    Fibromyalgia  - prior tx: gabapentin, flexeril   - cymbalta 20 mg daily (June 2019)   - continue to exercise    Medication monitoring   - CBC, CMP, sed rate, CRP q 4-6 months   - TB gold negative (4/2019)      No follow-ups on file. Electronically signed by XIANG Mueller CNP on 3/31/2022 at 3:09 PM    New Prescriptions    No medications on file       Thank you for allowing me to participate in the care of this patient. Please call if there are any questions.

## 2022-06-27 LAB
ALBUMIN SERPL-MCNC: 3.6 G/DL
ALP BLD-CCNC: 139 U/L
ALT SERPL-CCNC: 49 U/L
ANION GAP SERPL CALCULATED.3IONS-SCNC: 10 MMOL/L
AST SERPL-CCNC: 27 U/L
BASOPHILS ABSOLUTE: 0.03 /ΜL
BASOPHILS RELATIVE PERCENT: 0.4 %
BILIRUB SERPL-MCNC: 0.5 MG/DL (ref 0.1–1.4)
BUN BLDV-MCNC: 11 MG/DL
C-REACTIVE PROTEIN: 4.1
CALCIUM SERPL-MCNC: 9.3 MG/DL
CHLORIDE BLD-SCNC: 105 MMOL/L
CO2: 29 MMOL/L
CREAT SERPL-MCNC: 0.8 MG/DL
EOSINOPHILS ABSOLUTE: 0.14 /ΜL
EOSINOPHILS RELATIVE PERCENT: 2 %
GFR CALCULATED: 78
GLUCOSE BLD-MCNC: 91 MG/DL
HCT VFR BLD CALC: 38.8 % (ref 36–46)
HEMOGLOBIN: 12.6 G/DL (ref 12–16)
LYMPHOCYTES ABSOLUTE: 1.99 /ΜL
LYMPHOCYTES RELATIVE PERCENT: 28.9 %
MCH RBC QN AUTO: 28.8 PG
MCHC RBC AUTO-ENTMCNC: 32.5 G/DL
MCV RBC AUTO: 88.6 FL
MONOCYTES ABSOLUTE: 0.59 /ΜL
MONOCYTES RELATIVE PERCENT: 8.6 %
NEUTROPHILS ABSOLUTE: 4.11 /ΜL
NEUTROPHILS RELATIVE PERCENT: 59.8 %
PDW BLD-RTO: 43.4 %
PLATELET # BLD: 242 K/ΜL
PMV BLD AUTO: 10.9 FL
POTASSIUM SERPL-SCNC: 3.9 MMOL/L
RBC # BLD: 4.38 10^6/ΜL
SEDIMENTATION RATE, ERYTHROCYTE: 23
SODIUM BLD-SCNC: 140 MMOL/L
TOTAL PROTEIN: 7
WBC # BLD: 6.88 10^3/ML

## 2022-06-30 ENCOUNTER — OFFICE VISIT (OUTPATIENT)
Dept: RHEUMATOLOGY | Age: 57
End: 2022-06-30
Payer: COMMERCIAL

## 2022-06-30 VITALS
HEIGHT: 62 IN | SYSTOLIC BLOOD PRESSURE: 136 MMHG | DIASTOLIC BLOOD PRESSURE: 78 MMHG | HEART RATE: 78 BPM | BODY MASS INDEX: 36.95 KG/M2 | OXYGEN SATURATION: 99 % | WEIGHT: 200.8 LBS

## 2022-06-30 DIAGNOSIS — M05.79 RHEUMATOID ARTHRITIS INVOLVING MULTIPLE SITES WITH POSITIVE RHEUMATOID FACTOR (HCC): Primary | ICD-10-CM

## 2022-06-30 DIAGNOSIS — M54.42 CHRONIC MIDLINE LOW BACK PAIN WITH BILATERAL SCIATICA: ICD-10-CM

## 2022-06-30 DIAGNOSIS — Z51.81 MEDICATION MONITORING ENCOUNTER: ICD-10-CM

## 2022-06-30 DIAGNOSIS — R21 RASH IN ADULT: ICD-10-CM

## 2022-06-30 DIAGNOSIS — M79.7 FIBROMYALGIA: ICD-10-CM

## 2022-06-30 DIAGNOSIS — M54.41 CHRONIC MIDLINE LOW BACK PAIN WITH BILATERAL SCIATICA: ICD-10-CM

## 2022-06-30 DIAGNOSIS — G89.29 CHRONIC MIDLINE LOW BACK PAIN WITH BILATERAL SCIATICA: ICD-10-CM

## 2022-06-30 PROCEDURE — 99214 OFFICE O/P EST MOD 30 MIN: CPT | Performed by: NURSE PRACTITIONER

## 2022-06-30 ASSESSMENT — ENCOUNTER SYMPTOMS
SHORTNESS OF BREATH: 1
BACK PAIN: 1
EYE PAIN: 0
CONSTIPATION: 0
NAUSEA: 1
EYE ITCHING: 0
DIARRHEA: 1
COUGH: 0
ABDOMINAL PAIN: 1
TROUBLE SWALLOWING: 0

## 2022-06-30 NOTE — PATIENT INSTRUCTIONS
top knee, but keep your feet together. Do not let your hips roll back. Your legs should open up like a clamshell. 3. Hold for 6 seconds. 4. Slowly lower your knee back down. Rest for 10 seconds. 5. Repeat 8 to 12 times. Follow-up care is a key part of your treatment and safety. Be sure to make and go to all appointments, and call your doctor if you are having problems. It's also a good idea to know your test results and keep alist of the medicines you take. Where can you learn more? Go to [email or web address][facility] [email or web address] org and sign in to your [employer] account. Enter N492 in the [facility] box to learn more about \"Hip Bursitis: Exercises. \"     If you do not have an account, please click on the \"Sign Up Now\" link. Current as of: March 9, 2022               Content Version: 13.3  © 4239-7647 [employer]. Care instructions adapted under license by [facility] ([facility]). If you have questions about a medical condition or this instruction, always ask your healthcare professional. [place] [address] any warranty or liability for your use of this information.

## 2022-06-30 NOTE — PROGRESS NOTES
[facility] RHEUMATOLOGY FOLLOW UP NOTE       Date Of Service: 6/30/2022  Provider: XIANG Calle - YENY    Name: Barbra Hartman   MRN: [id number]    Chief Complaint(s)     Chief Complaint   Patient presents with    Follow-up     3 month follow up        History of Present Illness (HPI)     Barbra Hartman  is a(n)56 y.o. female with a hx of fibromyalgia, osteoporosis, GERD, rheumatoid arthritis, h/o hemorrhoids, fatty liver, dry eyes, HTN here for the f/u evaluation of rheumatoid arthritis, fibromyalgia, chronic low back pain w/ radiculopathy. Interval hx:    - scheduled for stress test tomorrow    - restarted orencia 3 months ago- ? Mild improvement    pain affecting the right fingers, wrists, elbows, shoulders, hips, left knee, ankles, toes, neck, back  Pain on a scale 0-10: 8.5/10  Type of pain: intermittent  Timing:none  Aggravating factors: back: sitting, standing, laying down    Associated symptoms:  Denies swelling/  Redness/ warmth (hands), + AM stiffness lasting ~ 10 minutes    REVIEW OF SYSTEMS: (ROS)    Review of Systems   Constitutional: Positive for fatigue. Negative for fever and unexpected weight change. HENT: Negative for congestion and trouble swallowing. Dry mouth   Eyes: Negative for pain and itching. Dry eyes   Respiratory: Positive for shortness of breath. Negative for cough. Cardiovascular: Negative for chest pain and leg swelling. Gastrointestinal: Positive for abdominal pain, diarrhea and nausea. Negative for constipation. Endocrine: Negative for cold intolerance and heat intolerance. Genitourinary: Negative for difficulty urinating, frequency and urgency. Musculoskeletal: Positive for arthralgias, back pain, myalgias and neck pain. Negative for joint swelling. Skin: Positive for rash. Neurological: Positive for weakness and headaches. Negative for dizziness and numbness. Psychiatric/Behavioral: Positive for sleep disturbance.  The patient is nervous/anxious. PAST MEDICAL HISTORY      Past Medical History:   Diagnosis Date    Fatty liver 2018    Fibromyalgia     GERD (gastroesophageal reflux disease)     Osteoporosis        PAST SURGICAL HISTORY      Past Surgical History:   Procedure Laterality Date    CATARACT REMOVAL  2014     SECTION  8555,[id number]    CHOLECYSTECTOMY  2016    COLONOSCOPY  2014    DILATION AND CURETTAGE OF UTERUS  1981    UPPER GASTROINTESTINAL ENDOSCOPY  2016       FAMILY HISTORY      Family History   Problem Relation Age of Onset    Lupus Mother     Arthritis Mother         MCTD / lupus    Rheum Arthritis Maternal Cousin     Rheum Arthritis Paternal Cousin     Rheum Arthritis Daughter        SOCIAL HISTORY      Social History     Tobacco History     Smoking Status  Never Smoker    Smokeless Tobacco Use  Never Used          Alcohol History     Alcohol Use Status  No          Drug Use     Drug Use Status  No          Sexual Activity     Sexually Active  Not Asked                ALLERGIES     Allergies   Allergen Reactions    Tape Shawna Schilder Tape]     Sulfa Antibiotics Nausea And Vomiting       CURRENT MEDICATIONS      Current Outpatient Medications   Medication Sig Dispense Refill    ORENCIA CLICKJECT 836 MG/ML SOAJ INJECT ONE CLICKJECT PEN SUBCUTANEOUSLY EVERY WEEK. REFRIGERATE. ALLOW TO WARM TO ROOM TEMPERATURE PRIOR TO ADMINISTRATION. 4 mL 6    losartan (COZAAR) 50 MG tablet Take 100 mg by mouth daily       omeprazole (PRILOSEC) 40 MG delayed release capsule TAKE 1 CAPSULE BY MOUTH EVERY DAY      clotrimazole-betamethasone (LOTRISONE) 1-0.05 % cream Apply topically 2 times daily Apply topically 2 times daily.       DULoxetine (CYMBALTA) 20 MG extended release capsule TAKE 1 CAPSULE BY MOUTH EVERY DAY  2    Naproxen Sodium (ALEVE) 220 MG CAPS Take 220 mg by mouth daily as needed for Pain      cycloSPORINE (RESTASIS) 0.05 % ophthalmic emulsion 1 drop 2 times daily      estradiol (ESTRACE) 0.1 MG/GM vaginal cream Place 1 g vaginally once a week (Patient not taking: Reported on 6/30/2022)       No current facility-administered medications for this visit. PHYSICAL EXAMINATION / OBJECTIVE   Objective:  /78 (Site: Left Upper Arm, Position: Sitting, Cuff Size: Large Adult)   Pulse 78   Ht 5' 2.01\" (1.575 m)   Wt 200 lb 12.8 oz (91.1 kg)   SpO2 99%   BMI 36.72 kg/m²     Physical Exam  Vitals reviewed. Constitutional:       Appearance: She is well-developed. Cardiovascular:      Rate and Rhythm: Normal rate and regular rhythm. Pulmonary:      Effort: Pulmonary effort is normal.      Breath sounds: Normal breath sounds. Musculoskeletal:      Cervical back: Normal range of motion and neck supple. Skin:     General: Skin is warm and dry. Findings: No rash. Neurological:      Mental Status: She is alert and oriented to person, place, and time. Psychiatric:         Thought Content:  Thought content normal.       Upper extremities:    SHOULDERS tender bilat ,   ELBOWS nontender,   WRISTS tender bilat,   HANDS/FINGERS nontender    Lower extremities:  HIPS nontender  KNEES nontender  ANKLES nontender   FEET : tender bilat MTPs    Spine:   Tender lumbosacral spine      RAPID 3:   6/30/2022 --- RAPID 3: 3.3 + 8 + 7 = 18.3     Remission: <3  Low Disease Activity: <6  Moderate Disease Activity: >=6 and <=12  High Disease Activity: >12     LABS    CBC  Lab Results   Component Value Date/Time    WBC 6.88 06/27/2022 12:00 AM    RBC 4.38 06/27/2022 12:00 AM    HGB 12.6 06/27/2022 12:00 AM    HCT 38.8 06/27/2022 12:00 AM    MCV 88.6 06/27/2022 12:00 AM    MCH 28.8 06/27/2022 12:00 AM    MCHC 32.5 06/27/2022 12:00 AM    RDW 43.4 06/27/2022 12:00 AM     06/27/2022 12:00 AM       CMP  Lab Results   Component Value Date/Time    CALCIUM 9.3 06/27/2022 12:00 AM    LABALBU 3.6 06/27/2022 12:00 AM    PROT 7.3 01/20/2020 03:11 PM     06/27/2022 12:00 AM    K 3.9 06/27/2022 12:00 AM    CO2 29 06/27/2022 12:00 AM     06/27/2022 12:00 AM    BUN 11 06/27/2022 12:00 AM    CREATININE 0.8 06/27/2022 12:00 AM    ALKPHOS 139 06/27/2022 12:00 AM    ALT 49 06/27/2022 12:00 AM    AST 27 06/27/2022 12:00 AM       HgBA1c: No components found for: HGBA1C    No results found for: VITD25      No results found for: ANASCRN  No results found for: SSA  No results found for: SSB  No results found for: ANTI-SMITH  No results found for: DSDNAAB   No results found for: ANTIRNP  No results found for: C3, C4  No results found for: CCPAB  Lab Results   Component Value Date    .5 07/23/2018       No components found for: CANCASCRN, APANCASCRN  Lab Results   Component Value Date    SEDRATE 23 06/27/2022     Lab Results   Component Value Date    CRP 4.1 06/27/2022       RADIOLOGY:         ASSESSMENT/PLAN    Assessment   Plan     Rheumatoid arthritis- +RF, + CCP  - prior tx: gabapentin (improved sleep), plaquenil (auditory changes), arava (RUQ pain), methotrexate (fatigue, GI upset), imuran (RUQ pain), enbrel (rash), remicaide (insurance issues), prednisone (sig relief), xeljanz (abd pain, GI upset), minocycline, humira (? Intolerance)   - orencia 125 mg subcu weekly (sept- oct 2021, restarted 3/2022)    Paresthesia feet: mild, intermittent    Chronic low back pain w/ bilateral radicular sx's  - MRI lumbar spine w/ degenerative arthritis    Rash- plaques in occipital region   - awaiting dermatology evaluation    Fibromyalgia  - prior tx: gabapentin, flexeril   - cymbalta 20 mg daily (June 2019)    Medication monitoring   - CBC, CMP, sed rate, CRP q 4-6 months   - TB gold negative (4/2019)      No follow-ups on file. Electronically signed by XIANG Mercedes CNP on 6/30/2022 at 2:44 PM    New Prescriptions    No medications on file       Thank you for allowing me to participate in the care of this patient. Please call if there are any questions.

## 2022-08-26 ASSESSMENT — JOINT PAIN
TOTAL NUMBER OF SWOLLEN JOINTS: 0
TOTAL NUMBER OF TENDER JOINTS: 4

## 2022-08-26 NOTE — PROGRESS NOTES
Physical Exam        BROWNING-28 (ESR): 3.41 (Moderate disease activity)  BROWNING-28 (CRP): 2.76 (Low disease activity)

## 2022-12-03 ENCOUNTER — PATIENT MESSAGE (OUTPATIENT)
Dept: RHEUMATOLOGY | Age: 57
End: 2022-12-03

## 2022-12-03 DIAGNOSIS — Z51.81 MEDICATION MONITORING ENCOUNTER: Primary | ICD-10-CM

## 2022-12-05 NOTE — TELEPHONE ENCOUNTER
From: Ralph Medina  To: [facility]: 12/3/2022 9:59 PM EST  Subject: Blood Work    I am not sure if my blood work order has  ??? (I had to cancel and reschedule my last appointment due to running a fever )  I get my blood work done at [facility] in [facility]. Please let me know. I see the Dr in a couple weeks.

## 2022-12-13 LAB
ALBUMIN SERPL-MCNC: 3.5 G/DL
ALP BLD-CCNC: 149 U/L
ALT SERPL-CCNC: 54 U/L
ANION GAP SERPL CALCULATED.3IONS-SCNC: 12 MMOL/L
AST SERPL-CCNC: 35 U/L
BASOPHILS ABSOLUTE: 0.01 /ΜL
BASOPHILS RELATIVE PERCENT: 0.2 %
BILIRUB SERPL-MCNC: 0.4 MG/DL (ref 0.1–1.4)
BUN BLDV-MCNC: 11 MG/DL
C-REACTIVE PROTEIN: 0.37
CALCIUM SERPL-MCNC: 8.3 MG/DL
CHLORIDE BLD-SCNC: 103 MMOL/L
CO2: 26 MMOL/L
CREAT SERPL-MCNC: 0.8 MG/DL
EOSINOPHILS ABSOLUTE: 0.1 /ΜL
EOSINOPHILS RELATIVE PERCENT: 2.1 %
GFR CALCULATED: 78
GLUCOSE BLD-MCNC: 117 MG/DL
HCT VFR BLD CALC: 35.8 % (ref 36–46)
HEMOGLOBIN: 11.8 G/DL (ref 12–16)
LYMPHOCYTES ABSOLUTE: 2.25 /ΜL
LYMPHOCYTES RELATIVE PERCENT: 47.7 %
MCH RBC QN AUTO: 28.4 PG
MCHC RBC AUTO-ENTMCNC: 33 G/DL
MCV RBC AUTO: 86.1 FL
MONOCYTES ABSOLUTE: 0.43 /ΜL
MONOCYTES RELATIVE PERCENT: 9.1 %
NEUTROPHILS ABSOLUTE: 1.93 /ΜL
NEUTROPHILS RELATIVE PERCENT: 40.9 %
PDW BLD-RTO: 42.4 %
PLATELET # BLD: 217 K/ΜL
PMV BLD AUTO: 10.5 FL
POTASSIUM SERPL-SCNC: 3.8 MMOL/L
RBC # BLD: 4.16 10^6/ΜL
SEDIMENTATION RATE, ERYTHROCYTE: 30
SODIUM BLD-SCNC: 137 MMOL/L
TOTAL PROTEIN: 6.7
WBC # BLD: 4.72 10^3/ML

## 2022-12-20 ENCOUNTER — OFFICE VISIT (OUTPATIENT)
Dept: RHEUMATOLOGY | Age: 57
End: 2022-12-20
Payer: COMMERCIAL

## 2022-12-20 VITALS
WEIGHT: 205.4 LBS | DIASTOLIC BLOOD PRESSURE: 74 MMHG | SYSTOLIC BLOOD PRESSURE: 116 MMHG | HEIGHT: 62 IN | OXYGEN SATURATION: 99 % | HEART RATE: 72 BPM | BODY MASS INDEX: 37.8 KG/M2

## 2022-12-20 DIAGNOSIS — M05.79 RHEUMATOID ARTHRITIS INVOLVING MULTIPLE SITES WITH POSITIVE RHEUMATOID FACTOR (HCC): Primary | ICD-10-CM

## 2022-12-20 DIAGNOSIS — M54.41 CHRONIC MIDLINE LOW BACK PAIN WITH BILATERAL SCIATICA: ICD-10-CM

## 2022-12-20 DIAGNOSIS — R06.09 DOE (DYSPNEA ON EXERTION): ICD-10-CM

## 2022-12-20 DIAGNOSIS — G89.29 CHRONIC MIDLINE LOW BACK PAIN WITH BILATERAL SCIATICA: ICD-10-CM

## 2022-12-20 DIAGNOSIS — M54.42 CHRONIC MIDLINE LOW BACK PAIN WITH BILATERAL SCIATICA: ICD-10-CM

## 2022-12-20 DIAGNOSIS — Z51.81 MEDICATION MONITORING ENCOUNTER: ICD-10-CM

## 2022-12-20 DIAGNOSIS — M79.7 FIBROMYALGIA: ICD-10-CM

## 2022-12-20 PROCEDURE — 99214 OFFICE O/P EST MOD 30 MIN: CPT | Performed by: INTERNAL MEDICINE

## 2022-12-20 RX ORDER — FOLIC ACID 1 MG/1
1 TABLET ORAL DAILY
Qty: 90 TABLET | Refills: 1 | Status: SHIPPED | OUTPATIENT
Start: 2022-12-20

## 2022-12-20 RX ORDER — PREDNISONE 10 MG/1
TABLET ORAL
Qty: 15 TABLET | Refills: 0 | Status: SHIPPED | OUTPATIENT
Start: 2022-12-20 | End: 2022-12-30

## 2022-12-20 RX ORDER — LOSARTAN POTASSIUM 100 MG/1
TABLET ORAL
COMMUNITY
Start: 2022-11-28

## 2022-12-20 RX ORDER — METHOTREXATE 25 MG/ML
10 INJECTION, SOLUTION INTRA-ARTERIAL; INTRAMUSCULAR; INTRAVENOUS WEEKLY
Qty: 4 ML | Refills: 0 | Status: SHIPPED | OUTPATIENT
Start: 2022-12-20

## 2022-12-20 RX ORDER — AMLODIPINE BESYLATE 5 MG/1
TABLET ORAL
COMMUNITY
Start: 2022-12-07

## 2022-12-20 ASSESSMENT — ENCOUNTER SYMPTOMS
SHORTNESS OF BREATH: 1
EYE ITCHING: 0
TROUBLE SWALLOWING: 0
COUGH: 0
EYE PAIN: 0
BACK PAIN: 1
DIARRHEA: 1
CONSTIPATION: 0
ABDOMINAL PAIN: 1
NAUSEA: 1

## 2022-12-20 ASSESSMENT — JOINT PAIN
TOTAL NUMBER OF TENDER JOINTS: 3
TOTAL NUMBER OF SWOLLEN JOINTS: 0

## 2022-12-20 NOTE — PROGRESS NOTES
[facility] RHEUMATOLOGY FOLLOW UP NOTE       Date Of Service: 12/20/2022  Provider: Cookie Jerome DO    Name: Alessandro Arboleda   MRN: [id number]    Chief Complaint(s)     Chief Complaint   Patient presents with    Follow-up     6 mo f/u, Rheumatoid arthritis involving multiple sites with positive rheumatoid factor ([facility] [address]    States she is still taking Orencia - not sure if it is working yet or not. States pain is 5/10 - R pointer finger, toes, L ankle (comes and goes). States on her L shin she has a lump with a red spot (noticed it about 3 weeks ago)        History of Present Illness (HPI)     Alessandro Arboleda  is a(n)57 y.o. female with a hx of fibromyalgia, osteoporosis, GERD, rheumatoid arthritis, h/o hemorrhoids, fatty liver, dry eyes, HTN here for the f/u evaluation of rheumatoid arthritis, fibromyalgia, chronic low back pain w/ radiculopathy. Reported declining w/ increased fatigue, mental fog (difficulty thinking clearly at time\", winded easily. Increased difficulty performing ADL such as shower b/c of fatigue. Decreased cooks. Arthralgia of the Right hand, mid back, neck, left ankle, bilat feet. Increased pain, swelling, Right 2nd mcp. Worsened arthralgia of the toes. Continued instability of the left ankle and occasional shin pain. Up to 5/10 over the past week. No particular timing. Aggravating factors: back: sitting, standing, laying down. Denies falls. Red spot / lump on left shin x 3 weeks -- denies new soaps , detergents, etc.  No associated pain or itching     -- headaches increased over the past few months. No particular area affected. No know triggers, aggravating factors. Alleviating: naproxen denies vision changes. + occasional nausea -occurring for quite a while. -- chest wall pain on the right > left - worsened. aggravating factors. direct pressure. No alleviating factors. Non-pleuritic, -- patient reported having dependent swelling bilateral ankles.  Prior evaluation by pcp with negative cardiac stress testing. REVIEW OF SYSTEMS: (ROS)    Review of Systems   Constitutional:  Positive for fatigue. Negative for fever and unexpected weight change. HENT:  Negative for congestion and trouble swallowing. Dry mouth   Eyes:  Negative for pain and itching. Dry eyes   Respiratory:  Positive for shortness of breath. Negative for cough. Cardiovascular:  Negative for chest pain and leg swelling. Gastrointestinal:  Positive for abdominal pain, diarrhea and nausea. Negative for constipation. Endocrine: Negative for cold intolerance and heat intolerance. Genitourinary:  Negative for difficulty urinating, frequency and urgency. Musculoskeletal:  Positive for arthralgias, back pain, myalgias and neck pain. Negative for joint swelling. Skin:  Positive for rash. Neurological:  Positive for weakness and headaches. Negative for dizziness and numbness. Psychiatric/Behavioral:  Positive for sleep disturbance. The patient is nervous/anxious. PmHx:  has a past medical history of Fatty liver, Fibromyalgia, GERD (gastroesophageal reflux disease), and Osteoporosis. Social History:  reports that she has never smoked. She has never used smokeless tobacco. She reports that she does not drink alcohol and does not use drugs. Allergies   Allergen Reactions    Tape Janit Nephew Tape]     Sulfa Antibiotics Nausea And Vomiting       CURRENT MEDICATIONS      Current Outpatient Medications:     amLODIPine (NORVASC) 5 MG tablet, TAKE 1 TABLET BY MOUTH EVERY DAY, Disp: , Rfl:     losartan (COZAAR) 100 MG tablet, TAKE 1 TABLET BY MOUTH EVERY DAY, Disp: , Rfl:     ORENCIA CLICKJECT 085 MG/ML SOAJ, INJECT ONE CLICKJECT PEN SUBCUTANEOUSLY EVERY WEEK. REFRIGERATE.  ALLOW TO WARM TO ROOM TEMPERATURE PRIOR TO ADMINISTRATION., Disp: 4 mL, Rfl: 6    estradiol (ESTRACE) 0.1 MG/GM vaginal cream, Place 1 g vaginally once a week, Disp: , Rfl:     losartan (COZAAR) 50 MG tablet, Take 100 mg by mouth daily , Disp: , Rfl:     omeprazole (PRILOSEC) 40 MG delayed release capsule, TAKE 1 CAPSULE BY MOUTH EVERY DAY, Disp: , Rfl:     clotrimazole-betamethasone (LOTRISONE) 1-0.05 % cream, Apply topically 2 times daily Apply topically 2 times daily. , Disp: , Rfl:     DULoxetine (CYMBALTA) 20 MG extended release capsule, TAKE 1 CAPSULE BY MOUTH EVERY DAY, Disp: , Rfl: 2    Naproxen Sodium 220 MG CAPS, Take 220 mg by mouth daily as needed for Pain, Disp: , Rfl:     cycloSPORINE (RESTASIS) 0.05 % ophthalmic emulsion, 1 drop 2 times daily, Disp: , Rfl:     PHYSICAL EXAMINATION / OBJECTIVE   Objective:  /74 (Site: Left Upper Arm, Position: Sitting, Cuff Size: Large Adult)   Pulse 72   Ht 5' 2.01\" (1.575 m)   Wt 205 lb 6.4 oz (93.2 kg)   SpO2 99%   BMI 37.56 kg/m²     Physical Exam  Vitals reviewed. Constitutional:       Appearance: She is well-developed. HENT:      Head: Normocephalic. Nose:      Comments: Scabbed nasal sore bilateral nasal septum      Mouth/Throat:      Mouth: Mucous membranes are moist.      Pharynx: No oropharyngeal exudate or posterior oropharyngeal erythema. Cardiovascular:      Rate and Rhythm: Normal rate and regular rhythm. Pulmonary:      Effort: Pulmonary effort is normal.      Breath sounds: Normal breath sounds. Musculoskeletal:         General: No swelling. Cervical back: Normal range of motion and neck supple. Right lower leg: Edema present. Left lower leg: Edema present. Skin:     General: Skin is warm and dry. Comments: Red papular eruption left dorsal proximal leg   Neurological:      Mental Status: She is alert and oriented to person, place, and time. Psychiatric:         Thought Content: Thought content normal.     Upper extremities:    Tender joints as below. No synovitis. Lower extremities:  Tender and swelling as below     Spine:   Tender Right > left chest wall and perispinal region, rib angles.          Physical Exam        BROWNING-28 (ESR): 3.46 (Moderate disease activity)  BROWNING-28 (CRP): 2.16 (Remission)                      LABS    CBC  Lab Results   Component Value Date/Time    WBC 4.72 12/13/2022 12:00 AM    RBC 4.16 12/13/2022 12:00 AM    HGB 11.8 12/13/2022 12:00 AM    HCT 35.8 12/13/2022 12:00 AM    MCV 86.1 12/13/2022 12:00 AM    MCH 28.4 12/13/2022 12:00 AM    MCHC 33.0 12/13/2022 12:00 AM    RDW 42.4 12/13/2022 12:00 AM     12/13/2022 12:00 AM       CMP  Lab Results   Component Value Date/Time    CALCIUM 8.3 12/13/2022 12:00 AM    LABALBU 3.5 12/13/2022 12:00 AM    PROT 7.3 01/20/2020 03:11 PM     12/13/2022 12:00 AM    K 3.8 12/13/2022 12:00 AM    CO2 26 12/13/2022 12:00 AM     12/13/2022 12:00 AM    BUN 11 12/13/2022 12:00 AM    CREATININE 0.8 12/13/2022 12:00 AM    ALKPHOS 149 12/13/2022 12:00 AM    ALT 54 12/13/2022 12:00 AM    AST 35 12/13/2022 12:00 AM       Lab Results   Component Value Date    .5 07/23/2018       No components found for: CANCASCRN, APANCASCRN  Lab Results   Component Value Date    SEDRATE 30 12/13/2022     Lab Results   Component Value Date    CRP 0.37 12/13/2022       RADIOLOGY:         ASSESSMENT/PLAN    Assessment   Plan     Rheumatoid arthritis- +RF, + CCP  - prior tx: gabapentin (improved sleep), plaquenil (auditory changes), arava (RUQ pain), methotrexate po and inj. (fatigue, GI upset, lft elevation), imuran (RUQ pain), enbrel (rash), remicaide (insurance issues), prednisone (sig relief), xeljanz (abd pain, GI upset), minocycline, humira (? Intolerance)     - orencia 125 mg subcu weekly (sept- oct 2021, restarted 3/2022)   - start Methotrexate 10mg subcu weekly + folic acid 1mg daily 17/46/97  b/c cont.  Arthralgia and esr elevation    - prednisoen taper b/c of worsening symptoms       Paresthesia feet: mild, intermittent    Chronic low back pain w/ bilateral radicular sx's  - MRI lumbar spine w/ degenerative arthritis    Rash- plaques in occipital region   [facility]    Fibromyalgia  - prior tx: gabapentin, flexeril   - cymbalta 20 mg daily (June 2019)    Medication monitoring   - CBC, CMP, sed rate, CRP q 4-6 months   - TB gold negative (4/2019)    SORTO- lungs clear to ausculation prior negative cardiac evaluation per patient. If symptoms persist will obtain echocardiogram and PFTs      No follow-ups on file. Electronically signed by Vinod Callaway DO on 12/20/2022 at 12:09 PM    New Prescriptions    No medications on file       Thank you for allowing me to participate in the care of this patient. Please call if there are any questions.

## 2023-01-11 DIAGNOSIS — M05.79 RHEUMATOID ARTHRITIS INVOLVING MULTIPLE SITES WITH POSITIVE RHEUMATOID FACTOR (HCC): ICD-10-CM

## 2023-01-12 RX ORDER — METHOTREXATE 25 MG/ML
10 INJECTION, SOLUTION INTRA-ARTERIAL; INTRAMUSCULAR; INTRAVENOUS WEEKLY
Qty: 2 ML | Refills: 1 | OUTPATIENT
Start: 2023-01-12

## 2023-01-23 LAB
ALBUMIN SERPL-MCNC: 3.9 G/DL
ALP BLD-CCNC: 99 U/L
ALT SERPL-CCNC: 54 U/L
ANION GAP SERPL CALCULATED.3IONS-SCNC: 14 MMOL/L
AST SERPL-CCNC: 35 U/L
BASOPHILS ABSOLUTE: 0.01 /ΜL
BASOPHILS RELATIVE PERCENT: 0.2 %
BILIRUB SERPL-MCNC: 0.4 MG/DL (ref 0.1–1.4)
BUN BLDV-MCNC: 9 MG/DL
C-REACTIVE PROTEIN: 0.3
CALCIUM SERPL-MCNC: 9.1 MG/DL
CHLORIDE BLD-SCNC: 104 MMOL/L
CO2: 27 MMOL/L
CREAT SERPL-MCNC: 0.8 MG/DL
EOSINOPHILS ABSOLUTE: 0.1 /ΜL
EOSINOPHILS RELATIVE PERCENT: 1.6 %
GFR SERPL CREATININE-BSD FRML MDRD: 78 ML/MIN/{1.73_M2}
GLUCOSE BLD-MCNC: 80 MG/DL
HCT VFR BLD CALC: 37.8 % (ref 36–46)
HEMOGLOBIN: 12.3 G/DL (ref 12–16)
LYMPHOCYTES ABSOLUTE: 2.26 /ΜL
LYMPHOCYTES RELATIVE PERCENT: 36.5 %
MCH RBC QN AUTO: 28.8 PG
MCHC RBC AUTO-ENTMCNC: 32.5 G/DL
MCV RBC AUTO: 88.5 FL
MONOCYTES ABSOLUTE: 0.51 /ΜL
MONOCYTES RELATIVE PERCENT: 8.2 %
NEUTROPHILS ABSOLUTE: 3.31 /ΜL
NEUTROPHILS RELATIVE PERCENT: 53.3 %
PDW BLD-RTO: 44.4 %
PLATELET # BLD: 264 K/ΜL
PMV BLD AUTO: 10.6 FL
POTASSIUM SERPL-SCNC: 3.8 MMOL/L
RBC # BLD: 4.27 10^6/ΜL
SEDIMENTATION RATE, ERYTHROCYTE: 18
SODIUM BLD-SCNC: 141 MMOL/L
TOTAL PROTEIN: 7
WBC # BLD: 6.2 10^3/ML

## 2023-01-26 DIAGNOSIS — M05.79 RHEUMATOID ARTHRITIS INVOLVING MULTIPLE SITES WITH POSITIVE RHEUMATOID FACTOR (HCC): ICD-10-CM

## 2023-01-26 RX ORDER — METHOTREXATE 25 MG/ML
10 INJECTION, SOLUTION INTRA-ARTERIAL; INTRAMUSCULAR; INTRAVENOUS WEEKLY
Qty: 4 ML | Refills: 0 | Status: SHIPPED | OUTPATIENT
Start: 2023-01-26

## 2023-02-07 DIAGNOSIS — M05.79 RHEUMATOID ARTHRITIS INVOLVING MULTIPLE SITES WITH POSITIVE RHEUMATOID FACTOR (HCC): ICD-10-CM

## 2023-02-07 RX ORDER — ABATACEPT 125 MG/ML
INJECTION, SOLUTION SUBCUTANEOUS
Qty: 4 EACH | Refills: 6 | Status: ACTIVE | OUTPATIENT
Start: 2023-02-07

## 2023-02-22 ENCOUNTER — OFFICE VISIT (OUTPATIENT)
Dept: RHEUMATOLOGY | Age: 58
End: 2023-02-22
Payer: COMMERCIAL

## 2023-02-22 VITALS
HEIGHT: 62 IN | HEART RATE: 72 BPM | WEIGHT: 205 LBS | BODY MASS INDEX: 37.73 KG/M2 | DIASTOLIC BLOOD PRESSURE: 78 MMHG | OXYGEN SATURATION: 98 % | SYSTOLIC BLOOD PRESSURE: 124 MMHG

## 2023-02-22 DIAGNOSIS — G89.29 CHRONIC MIDLINE LOW BACK PAIN WITH BILATERAL SCIATICA: ICD-10-CM

## 2023-02-22 DIAGNOSIS — M54.42 CHRONIC MIDLINE LOW BACK PAIN WITH BILATERAL SCIATICA: ICD-10-CM

## 2023-02-22 DIAGNOSIS — Z51.81 MEDICATION MONITORING ENCOUNTER: ICD-10-CM

## 2023-02-22 DIAGNOSIS — M54.41 CHRONIC MIDLINE LOW BACK PAIN WITH BILATERAL SCIATICA: ICD-10-CM

## 2023-02-22 DIAGNOSIS — M79.7 FIBROMYALGIA: ICD-10-CM

## 2023-02-22 DIAGNOSIS — M05.79 RHEUMATOID ARTHRITIS INVOLVING MULTIPLE SITES WITH POSITIVE RHEUMATOID FACTOR (HCC): Primary | ICD-10-CM

## 2023-02-22 PROCEDURE — 99214 OFFICE O/P EST MOD 30 MIN: CPT | Performed by: NURSE PRACTITIONER

## 2023-02-22 ASSESSMENT — ENCOUNTER SYMPTOMS
CONSTIPATION: 0
DIARRHEA: 1
COUGH: 0
BACK PAIN: 1
EYE PAIN: 0
EYE ITCHING: 0
NAUSEA: 1
ABDOMINAL PAIN: 1
SHORTNESS OF BREATH: 0
TROUBLE SWALLOWING: 0

## 2023-02-22 NOTE — PROGRESS NOTES
[facility] RHEUMATOLOGY FOLLOW UP NOTE       Date Of Service: 2/22/2023  Provider: Dixie Kurtz APRN - CNP    Name: Kell Toledo   MRN: [id number]    Chief Complaint(s)     Chief Complaint   Patient presents with    Follow-up     2 month f/u Rheumatoid arthritis involving multiple sites with positive rheumatoid factor    Lower back nd lower front stomach area, rt pinky elbow forearm area. Neck pain         History of Present Illness (HPI)     Kell Toledo  is a(n)57 y.o. female with a hx of fibromyalgia, osteoporosis, GERD, rheumatoid arthritis, h/o hemorrhoids, fatty liver, dry eyes, HTN here for the f/u evaluation of rheumatoid arthritis, fibromyalgia, chronic low back pain w/ radiculopathy. Interval hx:    - started methotrexate with some relief    pain affecting the right fingers, neck, back  Pain on a scale 0-10: 5.5/10  Type of pain: intermittent  Timing:none  Aggravating factors: back: sitting, standing, laying down    Associated symptoms:  Denies swelling/  Redness/ warmth, + AM stiffness lasting ~ 5 mins    REVIEW OF SYSTEMS: (ROS)    Review of Systems   Constitutional:  Positive for fatigue. Negative for fever and unexpected weight change. HENT:  Negative for congestion and trouble swallowing. Dry mouth   Eyes:  Negative for pain and itching. Dry eyes   Respiratory:  Negative for cough and shortness of breath. Cardiovascular:  Negative for chest pain and leg swelling. Gastrointestinal:  Positive for abdominal pain, diarrhea and nausea. Negative for constipation. Endocrine: Negative for cold intolerance and heat intolerance. Genitourinary:  Negative for difficulty urinating, frequency and urgency. Musculoskeletal:  Positive for arthralgias, back pain, myalgias and neck pain. Negative for joint swelling. Skin:  Negative for rash. Neurological:  Positive for weakness and headaches. Negative for dizziness and numbness.    Psychiatric/Behavioral:  Positive for sleep disturbance. The patient is nervous/anxious. PAST MEDICAL HISTORY      Past Medical History:   Diagnosis Date    Fatty liver 2018    Fibromyalgia     GERD (gastroesophageal reflux disease)     Osteoporosis     Sleep apnea        PAST SURGICAL HISTORY      Past Surgical History:   Procedure Laterality Date    CATARACT REMOVAL  2014     SECTION  [id number],[id number]    CHOLECYSTECTOMY  2016    COLONOSCOPY      DILATION AND CURETTAGE OF UTERUS  1981    UPPER GASTROINTESTINAL ENDOSCOPY  2016       FAMILY HISTORY      Family History   Problem Relation Age of Onset    Lupus Mother     Arthritis Mother         MCTD / lupus    Rheum Arthritis Maternal Cousin     Rheum Arthritis Paternal Cousin     Rheum Arthritis Daughter        SOCIAL HISTORY      Social History       Tobacco History       Smoking Status  Never Smoker      Smokeless Tobacco Use  Never Used              Alcohol History       Alcohol Use Status  No              Drug Use       Drug Use Status  No              Sexual Activity       Sexually Active  Not Asked                    ALLERGIES     Allergies   Allergen Reactions    Tape [Adhesive Tape]     Sulfa Antibiotics Nausea And Vomiting       CURRENT MEDICATIONS      Current Outpatient Medications   Medication Sig Dispense Refill    ORENCIA CLICKJECT 112 MG/ML SOAJ INJECT ONE CLICKJECT PEN SUBCUTANEOUSLY EVERY WEEK. REFRIGERATE. ALLOW TO WARM TO ROOM TEMPERATURE PRIOR TO ADMINISTRATION.  4 each 6    methotrexate Sodium 50 MG/2ML chemo injection Inject 0.4 mLs into the skin once a week 4 mL 0    amLODIPine (NORVASC) 5 MG tablet TAKE 1 TABLET BY MOUTH EVERY DAY      losartan (COZAAR) 100 MG tablet TAKE 1 TABLET BY MOUTH EVERY DAY      Insulin Syringe-Needle U-100 30G X 516\" 1 ML MISC 1 each by Does not apply route once a week 779 each 3    folic acid (FOLVITE) 1 MG tablet Take 1 tablet by mouth daily 90 tablet 1    estradiol (ESTRACE) 0.1 MG/GM vaginal cream Place 1 g vaginally once a week losartan (COZAAR) 50 MG tablet Take 100 mg by mouth daily       omeprazole (PRILOSEC) 40 MG delayed release capsule TAKE 1 CAPSULE BY MOUTH EVERY DAY      clotrimazole-betamethasone (LOTRISONE) 1-0.05 % cream Apply topically 2 times daily Apply topically 2 times daily. DULoxetine (CYMBALTA) 20 MG extended release capsule TAKE 1 CAPSULE BY MOUTH EVERY DAY  2    Naproxen Sodium 220 MG CAPS Take 220 mg by mouth daily as needed for Pain      cycloSPORINE (RESTASIS) 0.05 % ophthalmic emulsion 1 drop 2 times daily       No current facility-administered medications for this visit. PHYSICAL EXAMINATION / OBJECTIVE   Objective:  /78 (Site: Left Lower Arm, Position: Sitting, Cuff Size: Medium Adult)   Pulse 72   Ht 5' 2.01\" (1.575 m)   Wt 205 lb (93 kg)   SpO2 98%   BMI 37.49 kg/m²     Physical Exam  Vitals reviewed. Constitutional:       Appearance: She is well-developed. Cardiovascular:      Rate and Rhythm: Normal rate and regular rhythm. Pulmonary:      Effort: Pulmonary effort is normal.      Breath sounds: Normal breath sounds. Musculoskeletal:      Cervical back: Normal range of motion and neck supple. Skin:     General: Skin is warm and dry. Findings: No rash. Neurological:      Mental Status: She is alert and oriented to person, place, and time. Psychiatric:         Thought Content:  Thought content normal.     Upper extremities:    SHOULDERS nontender  ELBOWS nontender,   WRISTS  nontender  HANDS/FINGERS tender right 5th PIP    Lower extremities:  HIPS nontender  KNEES nontender  ANKLES nontender   FEET : nontender    Spine:   Tender lumbosacral spine      RAPID 3:   2/22/2023 --- RAPID 3: 3.3 + 8 + 7 = 18.3     Remission: <3  Low Disease Activity: <6  Moderate Disease Activity: >=6 and <=12  High Disease Activity: >12     LABS    CBC  Lab Results   Component Value Date/Time    WBC 6.20 01/23/2023 12:00 AM    RBC 4.27 01/23/2023 12:00 AM    HGB 12.3 01/23/2023 12:00 AM    HCT 37.8 01/23/2023 12:00 AM    MCV 88.5 01/23/2023 12:00 AM    MCH 28.8 01/23/2023 12:00 AM    MCHC 32.5 01/23/2023 12:00 AM    RDW 44.4 01/23/2023 12:00 AM     01/23/2023 12:00 AM       CMP  Lab Results   Component Value Date/Time    CALCIUM 9.1 01/23/2023 12:00 AM    LABALBU 3.9 01/23/2023 12:00 AM    PROT 7.3 01/20/2020 03:11 PM     01/23/2023 12:00 AM    K 3.8 01/23/2023 12:00 AM    CO2 27 01/23/2023 12:00 AM     01/23/2023 12:00 AM    BUN 9 01/23/2023 12:00 AM    CREATININE 0.8 01/23/2023 12:00 AM    ALKPHOS 99 01/23/2023 12:00 AM    ALT 54 01/23/2023 12:00 AM    AST 35 01/23/2023 12:00 AM       HgBA1c: No components found for: HGBA1C    No results found for: VITD25      No results found for: ANASCRN  No results found for: SSA  No results found for: SSB  No results found for: ANTI-SMITH  No results found for: DSDNAAB   No results found for: ANTIRNP  No results found for: C3, C4  No results found for: CCPAB  Lab Results   Component Value Date    .5 07/23/2018       No components found for: CANCASCRN, APANCASCRN  Lab Results   Component Value Date    SEDRATE 18 01/23/2023     Lab Results   Component Value Date    CRP 0.30 01/23/2023       RADIOLOGY:         ASSESSMENT/PLAN    Assessment   Plan     Rheumatoid arthritis- +RF, + CCP  - prior tx: gabapentin (improved sleep), plaquenil (auditory changes), arava (RUQ pain), methotrexate (fatigue, GI upset), imuran (RUQ pain), enbrel (rash), remicaide (insurance issues), prednisone (sig relief), xeljanz (abd pain, GI upset), minocycline, humira (? Intolerance)   - orencia 125 mg subcu weekly (sept- oct 2021, restarted 3/2022)   - methotrexate 10 mg subcu weekly & folic acid 1 mg daily    Paresthesia feet: mild, intermittent    Chronic low back pain w/ bilateral radicular sx's  - MRI lumbar spine w/ degenerative arthritis    Rash- plaques in occipital region.  S/p derm evaluation in [place] w/ suspected dandruff    Fibromyalgia  - prior tx: gabapentin, flexeril   - cymbalta 20 mg daily (June 2019)    SORTO- resolved     Medication monitoring   - CBC, CMP, sed rate, CRP q 4 weeks x 1   - TB gold negative (4/2019)      No follow-ups on file. Electronically signed by Rg Sanchez, XIANG Murguia CNP on 2/22/2023 at 11:28 AM    New Prescriptions    No medications on file       Thank you for allowing me to participate in the care of this patient. Please call if there are any questions.

## 2023-03-24 LAB
ALBUMIN SERPL-MCNC: 4.3 G/DL
ALP BLD-CCNC: 80 U/L
ALT SERPL-CCNC: 29 U/L
ANION GAP SERPL CALCULATED.3IONS-SCNC: 10 MMOL/L
AST SERPL-CCNC: 33 U/L
BASOPHILS ABSOLUTE: 0.01 /ΜL
BASOPHILS RELATIVE PERCENT: 0.1 %
BILIRUB SERPL-MCNC: 0.4 MG/DL (ref 0.1–1.4)
BUN BLDV-MCNC: 8 MG/DL
C-REACTIVE PROTEIN: 9.1
CALCIUM SERPL-MCNC: 8.8 MG/DL
CHLORIDE BLD-SCNC: 104 MMOL/L
CO2: 28 MMOL/L
CREAT SERPL-MCNC: 0.6 MG/DL
EGFR: NORMAL
EOSINOPHILS ABSOLUTE: 0.16 /ΜL
EOSINOPHILS RELATIVE PERCENT: 2.4 %
GLUCOSE BLD-MCNC: 69 MG/DL
HCT VFR BLD CALC: 39.2 % (ref 36–46)
HEMOGLOBIN: 13 G/DL (ref 12–16)
LYMPHOCYTES ABSOLUTE: 2.56 /ΜL
LYMPHOCYTES RELATIVE PERCENT: 38.2 %
MCH RBC QN AUTO: 29.7 PG
MCHC RBC AUTO-ENTMCNC: 33.2 G/DL
MCV RBC AUTO: 89.7 FL
MONOCYTES ABSOLUTE: 0.66 /ΜL
MONOCYTES RELATIVE PERCENT: 9.8 %
NEUTROPHILS ABSOLUTE: 3.3 /ΜL
NEUTROPHILS RELATIVE PERCENT: 49.2 %
PDW BLD-RTO: 44.6 %
PLATELET # BLD: 275 K/ΜL
PMV BLD AUTO: 11 FL
POTASSIUM SERPL-SCNC: 3.8 MMOL/L
RBC # BLD: 4.37 10^6/ΜL
SEDIMENTATION RATE, ERYTHROCYTE: 19
SODIUM BLD-SCNC: 139 MMOL/L
TOTAL PROTEIN: 6.9
WBC # BLD: 6.71 10^3/ML

## 2023-03-27 DIAGNOSIS — M05.79 RHEUMATOID ARTHRITIS INVOLVING MULTIPLE SITES WITH POSITIVE RHEUMATOID FACTOR (HCC): ICD-10-CM

## 2023-03-27 RX ORDER — METHOTREXATE 25 MG/ML
10 INJECTION, SOLUTION INTRA-ARTERIAL; INTRAMUSCULAR; INTRAVENOUS WEEKLY
Qty: 4 ML | Refills: 0 | Status: SHIPPED | OUTPATIENT
Start: 2023-03-27

## 2023-04-26 LAB
ALBUMIN SERPL-MCNC: 4.3 G/DL
ALP BLD-CCNC: 94 U/L
ALT SERPL-CCNC: 27 U/L
ANION GAP SERPL CALCULATED.3IONS-SCNC: 14 MMOL/L
AST SERPL-CCNC: 34 U/L
BASOPHILS ABSOLUTE: 0.02 /ΜL
BASOPHILS RELATIVE PERCENT: 0.3 %
BILIRUB SERPL-MCNC: 0.7 MG/DL (ref 0.1–1.4)
BUN BLDV-MCNC: 11 MG/DL
C-REACTIVE PROTEIN: 9.8
CALCIUM SERPL-MCNC: 9 MG/DL
CHLORIDE BLD-SCNC: 104 MMOL/L
CO2: 25 MMOL/L
CREAT SERPL-MCNC: 0.6 MG/DL
EGFR: 109
EOSINOPHILS ABSOLUTE: 0.32 /ΜL
EOSINOPHILS RELATIVE PERCENT: 4.4 %
GLUCOSE BLD-MCNC: 103 MG/DL
HCT VFR BLD CALC: 37.7 % (ref 36–46)
HEMOGLOBIN: 12.1 G/DL (ref 12–16)
LYMPHOCYTES ABSOLUTE: 2.18 /ΜL
LYMPHOCYTES RELATIVE PERCENT: 30.2 %
MCH RBC QN AUTO: 28.7 PG
MCHC RBC AUTO-ENTMCNC: 32.1 G/DL
MCV RBC AUTO: 89.3 FL
MONOCYTES ABSOLUTE: 0.68 /ΜL
MONOCYTES RELATIVE PERCENT: 9.4 %
NEUTROPHILS ABSOLUTE: 4 /ΜL
NEUTROPHILS RELATIVE PERCENT: 55.3 %
PDW BLD-RTO: 42.1 %
PLATELET # BLD: 254 K/ΜL
PMV BLD AUTO: 10.9 FL
POTASSIUM SERPL-SCNC: 3.7 MMOL/L
RBC # BLD: 4.22 10^6/ΜL
SEDIMENTATION RATE, ERYTHROCYTE: 23
SODIUM BLD-SCNC: 140 MMOL/L
TOTAL PROTEIN: 7.2
WBC # BLD: 7.23 10^3/ML

## 2023-04-28 DIAGNOSIS — M05.79 RHEUMATOID ARTHRITIS INVOLVING MULTIPLE SITES WITH POSITIVE RHEUMATOID FACTOR (HCC): ICD-10-CM

## 2023-04-28 RX ORDER — METHOTREXATE 25 MG/ML
10 INJECTION, SOLUTION INTRA-ARTERIAL; INTRAMUSCULAR; INTRAVENOUS WEEKLY
Qty: 4 ML | Refills: 0 | Status: SHIPPED | OUTPATIENT
Start: 2023-04-28

## 2023-05-19 DIAGNOSIS — M05.79 RHEUMATOID ARTHRITIS INVOLVING MULTIPLE SITES WITH POSITIVE RHEUMATOID FACTOR (HCC): ICD-10-CM

## 2023-05-22 ENCOUNTER — HOSPITAL ENCOUNTER (OUTPATIENT)
Dept: GENERAL RADIOLOGY | Age: 58
Discharge: HOME | End: 2023-05-22
Payer: COMMERCIAL

## 2023-05-22 ENCOUNTER — OFFICE VISIT (OUTPATIENT)
Dept: RHEUMATOLOGY | Age: 58
End: 2023-05-22
Payer: COMMERCIAL

## 2023-05-22 ENCOUNTER — HOSPITAL ENCOUNTER (OUTPATIENT)
Age: 58
Discharge: HOME | End: 2023-05-22
Payer: COMMERCIAL

## 2023-05-22 VITALS
DIASTOLIC BLOOD PRESSURE: 76 MMHG | HEIGHT: 62 IN | WEIGHT: 205.4 LBS | BODY MASS INDEX: 37.8 KG/M2 | HEART RATE: 85 BPM | OXYGEN SATURATION: 98 % | SYSTOLIC BLOOD PRESSURE: 132 MMHG

## 2023-05-22 DIAGNOSIS — G89.29 CHRONIC MIDLINE LOW BACK PAIN WITH BILATERAL SCIATICA: ICD-10-CM

## 2023-05-22 DIAGNOSIS — M54.42 CHRONIC MIDLINE LOW BACK PAIN WITH BILATERAL SCIATICA: ICD-10-CM

## 2023-05-22 DIAGNOSIS — Z51.81 MEDICATION MONITORING ENCOUNTER: ICD-10-CM

## 2023-05-22 DIAGNOSIS — M54.41 CHRONIC MIDLINE LOW BACK PAIN WITH BILATERAL SCIATICA: ICD-10-CM

## 2023-05-22 DIAGNOSIS — M25.552 CHRONIC LEFT HIP PAIN: ICD-10-CM

## 2023-05-22 DIAGNOSIS — M79.7 FIBROMYALGIA: ICD-10-CM

## 2023-05-22 DIAGNOSIS — G89.29 CHRONIC LEFT HIP PAIN: ICD-10-CM

## 2023-05-22 DIAGNOSIS — M05.79 RHEUMATOID ARTHRITIS INVOLVING MULTIPLE SITES WITH POSITIVE RHEUMATOID FACTOR (HCC): Primary | ICD-10-CM

## 2023-05-22 PROCEDURE — 73502 X-RAY EXAM HIP UNI 2-3 VIEWS: CPT

## 2023-05-22 PROCEDURE — 99214 OFFICE O/P EST MOD 30 MIN: CPT | Performed by: NURSE PRACTITIONER

## 2023-05-22 ASSESSMENT — ENCOUNTER SYMPTOMS
NAUSEA: 1
DIARRHEA: 0
ABDOMINAL PAIN: 0
TROUBLE SWALLOWING: 0
EYE ITCHING: 0
COUGH: 0
SHORTNESS OF BREATH: 0
CONSTIPATION: 0
EYE PAIN: 0
BACK PAIN: 1

## 2023-05-22 NOTE — PROGRESS NOTES
[facility] RHEUMATOLOGY FOLLOW UP NOTE       Date Of Service: 5/22/2023  Provider: XIANG Davis - CNP    Name: Leatha Gomez   MRN: [id number]    Chief Complaint(s)     Chief Complaint   Patient presents with    Follow-up     3 mo f/u, Rheumatoid arthritis involving multiple sites with positive rheumatoid factor ([facility] [address]    Pt stated pain 5/10 - All over,  muscles, stiffness        History of Present Illness (HPI)     Leatha Gomez  is a(n)57 y.o. female with a hx of fibromyalgia, osteoporosis, GERD, rheumatoid arthritis, h/o hemorrhoids, fatty liver, dry eyes, HTN here for the f/u evaluation of rheumatoid arthritis, fibromyalgia, chronic low back pain w/ radiculopathy. Interval hx:    - increased left hip pain   - was off medications for some time due to illness    pain affecting the right fingers, wrists, left hip, feet, neck  Pain on a scale 0-10: 5/10  Type of pain: intermittent  Timing:none  Aggravating factors: left hip: standing, walking    Associated symptoms:  +  swelling/  Redness/ warmth (ankles, feet), + AM stiffness lasting ~ 15 minutes    REVIEW OF SYSTEMS: (ROS)    Review of Systems   Constitutional:  Positive for fatigue. Negative for fever and unexpected weight change. HENT:  Negative for congestion and trouble swallowing. Dry mouth   Eyes:  Negative for pain and itching. Dry eyes   Respiratory:  Negative for cough and shortness of breath. Cardiovascular:  Negative for chest pain and leg swelling. Gastrointestinal:  Positive for nausea. Negative for abdominal pain, constipation and diarrhea. Endocrine: Negative for cold intolerance and heat intolerance. Genitourinary:  Negative for difficulty urinating, frequency and urgency. Musculoskeletal:  Positive for arthralgias, back pain, joint swelling, myalgias and neck pain. Skin:  Negative for rash. Neurological:  Positive for weakness and headaches. Negative for dizziness and numbness.

## 2023-05-23 RX ORDER — METHOTREXATE 25 MG/ML
10 INJECTION, SOLUTION INTRA-ARTERIAL; INTRAMUSCULAR; INTRAVENOUS WEEKLY
Qty: 2 ML | Refills: 1 | OUTPATIENT
Start: 2023-05-23

## 2023-06-16 LAB
ALBUMIN SERPL-MCNC: 4.3 G/DL
ALP BLD-CCNC: 88 U/L
ALT SERPL-CCNC: 66 U/L
ANION GAP SERPL CALCULATED.3IONS-SCNC: 15 MMOL/L
AST SERPL-CCNC: 62 U/L
BASOPHILS ABSOLUTE: 0.01 /ΜL
BASOPHILS RELATIVE PERCENT: 0.2 %
BILIRUB SERPL-MCNC: 0.7 MG/DL (ref 0.1–1.4)
BUN BLDV-MCNC: 11 MG/DL
C-REACTIVE PROTEIN: 5.4
CALCIUM SERPL-MCNC: 8.9 MG/DL
CHLORIDE BLD-SCNC: 104 MMOL/L
CO2: 23 MMOL/L
CREAT SERPL-MCNC: 0.7 MG/DL
EGFR: 91
EOSINOPHILS ABSOLUTE: 0.18 /ΜL
EOSINOPHILS RELATIVE PERCENT: 2.8 %
GLUCOSE BLD-MCNC: 105 MG/DL
HCT VFR BLD CALC: 36.4 % (ref 36–46)
HEMOGLOBIN: 12 G/DL (ref 12–16)
LYMPHOCYTES ABSOLUTE: 2.59 /ΜL
LYMPHOCYTES RELATIVE PERCENT: 39.7 %
MCH RBC QN AUTO: 29 PG
MCHC RBC AUTO-ENTMCNC: 33 G/DL
MCV RBC AUTO: 87.9 FL
MONOCYTES ABSOLUTE: 0.56 /ΜL
MONOCYTES RELATIVE PERCENT: 8.6 %
NEUTROPHILS ABSOLUTE: 3.16 /ΜL
NEUTROPHILS RELATIVE PERCENT: 48.4 %
PDW BLD-RTO: 45 %
PLATELET # BLD: 245 K/ΜL
PMV BLD AUTO: 10.9 FL
POTASSIUM SERPL-SCNC: 3.9 MMOL/L
RBC # BLD: 4.14 10^6/ΜL
SEDIMENTATION RATE, ERYTHROCYTE: 19
SODIUM BLD-SCNC: 138 MMOL/L
TOTAL PROTEIN: 7.1
WBC # BLD: 6.52 10^3/ML

## 2023-06-19 DIAGNOSIS — R79.89 LFT ELEVATION: Primary | ICD-10-CM

## 2023-06-19 NOTE — PROGRESS NOTES
Diagnosis Orders   1. LFT elevation  Hepatic Function Panel        Hold Methotrexate and re-evaluate labs in 2 weeks.

## 2023-06-22 DIAGNOSIS — M05.79 RHEUMATOID ARTHRITIS INVOLVING MULTIPLE SITES WITH POSITIVE RHEUMATOID FACTOR (HCC): ICD-10-CM

## 2023-06-22 RX ORDER — FOLIC ACID 1 MG/1
TABLET ORAL
Qty: 90 TABLET | Refills: 1 | Status: SHIPPED | OUTPATIENT
Start: 2023-06-22

## 2023-07-21 LAB
ALBUMIN SERPL-MCNC: 4.4 G/DL
ALP BLD-CCNC: 75 U/L
ALT SERPL-CCNC: 28 U/L
ANION GAP SERPL CALCULATED.3IONS-SCNC: 13 MMOL/L
AST SERPL-CCNC: 38 U/L
BASOPHILS ABSOLUTE: 0.03 /ΜL
BASOPHILS RELATIVE PERCENT: 0.4 %
BILIRUB SERPL-MCNC: 0.5 MG/DL (ref 0.1–1.4)
BUN BLDV-MCNC: 13 MG/DL
C-REACTIVE PROTEIN: 5
CALCIUM SERPL-MCNC: 9.2 MG/DL
CHLORIDE BLD-SCNC: 102 MMOL/L
CO2: 24 MMOL/L
CREAT SERPL-MCNC: 0.7 MG/DL
EGFR: 91
EOSINOPHILS ABSOLUTE: 0.14 /ΜL
EOSINOPHILS RELATIVE PERCENT: 1.9 %
GLUCOSE BLD-MCNC: 97 MG/DL
HCT VFR BLD CALC: 37 % (ref 36–46)
HEMOGLOBIN: 12.1 G/DL (ref 12–16)
LYMPHOCYTES ABSOLUTE: 2.44 /ΜL
LYMPHOCYTES RELATIVE PERCENT: 32.8 %
MCH RBC QN AUTO: 28.7 PG
MCHC RBC AUTO-ENTMCNC: 32.7 G/DL
MCV RBC AUTO: 87.7 FL
MONOCYTES ABSOLUTE: 0.6 /ΜL
MONOCYTES RELATIVE PERCENT: 8.1 %
NEUTROPHILS ABSOLUTE: 4.23 /ΜL
NEUTROPHILS RELATIVE PERCENT: 56.7 %
PDW BLD-RTO: 44 %
PLATELET # BLD: 252 K/ΜL
PMV BLD AUTO: 10.9 FL
POTASSIUM SERPL-SCNC: 4.1 MMOL/L
RBC # BLD: 4.22 10^6/ΜL
SEDIMENTATION RATE, ERYTHROCYTE: 23
SODIUM BLD-SCNC: 135 MMOL/L
TOTAL PROTEIN: 7.6
WBC # BLD: 7.45 10^3/ML

## 2023-07-25 DIAGNOSIS — M05.79 RHEUMATOID ARTHRITIS INVOLVING MULTIPLE SITES WITH POSITIVE RHEUMATOID FACTOR (HCC): ICD-10-CM

## 2023-07-25 RX ORDER — METHOTREXATE 25 MG/ML
10 INJECTION, SOLUTION INTRA-ARTERIAL; INTRAMUSCULAR; INTRAVENOUS WEEKLY
Qty: 4 ML | Refills: 0 | Status: SHIPPED | OUTPATIENT
Start: 2023-07-25

## 2023-08-22 ENCOUNTER — OFFICE VISIT (OUTPATIENT)
Dept: RHEUMATOLOGY | Age: 58
End: 2023-08-22
Payer: COMMERCIAL

## 2023-08-22 VITALS
DIASTOLIC BLOOD PRESSURE: 80 MMHG | HEART RATE: 86 BPM | OXYGEN SATURATION: 98 % | HEIGHT: 62 IN | SYSTOLIC BLOOD PRESSURE: 132 MMHG | BODY MASS INDEX: 38.16 KG/M2 | WEIGHT: 207.4 LBS

## 2023-08-22 DIAGNOSIS — M79.7 FIBROMYALGIA: ICD-10-CM

## 2023-08-22 DIAGNOSIS — M54.42 CHRONIC MIDLINE LOW BACK PAIN WITH BILATERAL SCIATICA: ICD-10-CM

## 2023-08-22 DIAGNOSIS — R53.83 OTHER FATIGUE: ICD-10-CM

## 2023-08-22 DIAGNOSIS — M54.41 CHRONIC MIDLINE LOW BACK PAIN WITH BILATERAL SCIATICA: ICD-10-CM

## 2023-08-22 DIAGNOSIS — Z51.81 MEDICATION MONITORING ENCOUNTER: ICD-10-CM

## 2023-08-22 DIAGNOSIS — G89.29 CHRONIC MIDLINE LOW BACK PAIN WITH BILATERAL SCIATICA: ICD-10-CM

## 2023-08-22 DIAGNOSIS — M05.79 RHEUMATOID ARTHRITIS INVOLVING MULTIPLE SITES WITH POSITIVE RHEUMATOID FACTOR (HCC): ICD-10-CM

## 2023-08-22 DIAGNOSIS — M25.552 CHRONIC LEFT HIP PAIN: Primary | ICD-10-CM

## 2023-08-22 DIAGNOSIS — G89.29 CHRONIC LEFT HIP PAIN: Primary | ICD-10-CM

## 2023-08-22 PROCEDURE — 99214 OFFICE O/P EST MOD 30 MIN: CPT | Performed by: INTERNAL MEDICINE

## 2023-08-22 RX ORDER — LEFLUNOMIDE 20 MG/1
40 TABLET ORAL WEEKLY
Qty: 8 TABLET | Refills: 0 | Status: SHIPPED | OUTPATIENT
Start: 2023-08-22

## 2023-08-22 ASSESSMENT — JOINT PAIN
TOTAL NUMBER OF TENDER JOINTS: 1
TOTAL NUMBER OF SWOLLEN JOINTS: 0

## 2023-08-22 ASSESSMENT — ENCOUNTER SYMPTOMS
RESPIRATORY NEGATIVE: 1
CONSTIPATION: 1
EYES NEGATIVE: 1

## 2023-08-22 NOTE — PROGRESS NOTES
Dr Mccain do you see these papers.   MCV 87.7 07/21/2023 12:00 AM    MCH 28.7 07/21/2023 12:00 AM    MCHC 32.7 07/21/2023 12:00 AM    RDW 44.0 07/21/2023 12:00 AM     07/21/2023 12:00 AM       CMP  Lab Results   Component Value Date/Time    CALCIUM 9.2 07/21/2023 12:00 AM    LABALBU 4.4 07/21/2023 12:00 AM    PROT 7.3 01/20/2020 03:11 PM     07/21/2023 12:00 AM    K 4.1 07/21/2023 12:00 AM    CO2 24 07/21/2023 12:00 AM     07/21/2023 12:00 AM    BUN 13 07/21/2023 12:00 AM    CREATININE 0.70 07/21/2023 12:00 AM    ALKPHOS 75 07/21/2023 12:00 AM    ALT 28 07/21/2023 12:00 AM    AST 38 07/21/2023 12:00 AM     Lab Results   Component Value Date    .5 07/23/2018         Lab Results   Component Value Date    SEDRATE 23 07/21/2023     Lab Results   Component Value Date    CRP 5.0 07/21/2023       RADIOLOGY:         ASSESSMENT/PLAN    Assessment   Plan     Rheumatoid arthritis-   +RF, + CCP, + synovitis, esr elevation. prior tx: gabapentin (improved sleep), plaquenil (auditory changes), arava (RUQ pain), methotrexate (fatigue, GI upset), imuran (RUQ pain), enbrel (rash), remicaide (insurance issues), prednisone (sig relief), xeljanz (abd pain, GI upset), minocycline, humira (? Intolerance), methotrexate 10 mg subcu weekly & folic acid 1 mg daily (12/2022 - may 2023)- stopped b/c ofLFTelevation    - orencia 125 mg subcu weekly (sept- oct 2021, restarted 3/2022)   - start arava 40mg week 8/22/2023 ((prior GI upset with 20mg daily) - b/c SC joitn tenderness, active dz by rapid 3       Bilateral hip pain - susepcted troch bursitis. -- has home exercises - reviewed today. - x-ray  w/o abnormalities    Left > right leg swelling-  + 1 pitting edema left lower extremity. Ankle swelling, cool to touch   - asked patient to follow up with PCP   - compression stockings bilateral.     Numbness right arm -    - + tinel Right wrist and Right forarm.  + tinel Right     Chronic low back pain w/ bilateral radicular sx's  - MRI lumbar spine w/

## 2023-08-30 LAB
ALBUMIN SERPL-MCNC: 4.6 G/DL
ALP BLD-CCNC: 95 U/L
ALT SERPL-CCNC: 29 U/L
ANION GAP SERPL CALCULATED.3IONS-SCNC: 13 MMOL/L
AST SERPL-CCNC: 32 U/L
BASOPHILS ABSOLUTE: 0.01 /ΜL
BASOPHILS RELATIVE PERCENT: 0.1 %
BILIRUB SERPL-MCNC: 0.5 MG/DL (ref 0.1–1.4)
BUN BLDV-MCNC: 9 MG/DL
CALCIUM SERPL-MCNC: 9.8 MG/DL
CHLORIDE BLD-SCNC: 103 MMOL/L
CO2: 26 MMOL/L
CREAT SERPL-MCNC: 0.7 MG/DL
EGFR: 91
EOSINOPHILS ABSOLUTE: 0.16 /ΜL
EOSINOPHILS RELATIVE PERCENT: 2.3 %
GLUCOSE BLD-MCNC: 110 MG/DL
HCT VFR BLD CALC: 39.3 % (ref 36–46)
HEMOGLOBIN: 12.8 G/DL (ref 12–16)
LYMPHOCYTES ABSOLUTE: 2.48 /ΜL
LYMPHOCYTES RELATIVE PERCENT: 35.8 %
MCH RBC QN AUTO: 28.5 PG
MCHC RBC AUTO-ENTMCNC: 32.6 G/DL
MCV RBC AUTO: 87.5 FL
MONOCYTES ABSOLUTE: 0.55 /ΜL
MONOCYTES RELATIVE PERCENT: 7.9 %
NEUTROPHILS ABSOLUTE: 3.7 /ΜL
NEUTROPHILS RELATIVE PERCENT: 53.6 %
PDW BLD-RTO: 42.6 %
PLATELET # BLD: 267 K/ΜL
PMV BLD AUTO: 10.8 FL
POTASSIUM SERPL-SCNC: 3.8 MMOL/L
RBC # BLD: 4.49 10^6/ΜL
SEDIMENTATION RATE, ERYTHROCYTE: 23
SODIUM BLD-SCNC: 139 MMOL/L
TOTAL PROTEIN: 7.9
WBC # BLD: 6.92 10^3/ML

## 2023-09-11 DIAGNOSIS — M05.79 RHEUMATOID ARTHRITIS INVOLVING MULTIPLE SITES WITH POSITIVE RHEUMATOID FACTOR (HCC): ICD-10-CM

## 2023-09-11 RX ORDER — LEFLUNOMIDE 20 MG/1
40 TABLET ORAL WEEKLY
Qty: 8 TABLET | Refills: 0 | Status: SHIPPED | OUTPATIENT
Start: 2023-09-11

## 2023-09-19 DIAGNOSIS — M05.79 RHEUMATOID ARTHRITIS INVOLVING MULTIPLE SITES WITH POSITIVE RHEUMATOID FACTOR (HCC): ICD-10-CM

## 2023-09-19 RX ORDER — LEFLUNOMIDE 20 MG/1
40 TABLET ORAL WEEKLY
Qty: 8 TABLET | Refills: 0 | OUTPATIENT
Start: 2023-09-19

## 2023-09-21 DIAGNOSIS — M05.79 RHEUMATOID ARTHRITIS INVOLVING MULTIPLE SITES WITH POSITIVE RHEUMATOID FACTOR (HCC): ICD-10-CM

## 2023-09-22 RX ORDER — ABATACEPT 125 MG/ML
125 INJECTION, SOLUTION SUBCUTANEOUS
Qty: 4 EACH | Refills: 11 | Status: SHIPPED | OUTPATIENT
Start: 2023-09-22

## 2023-10-05 DIAGNOSIS — M05.79 RHEUMATOID ARTHRITIS INVOLVING MULTIPLE SITES WITH POSITIVE RHEUMATOID FACTOR (HCC): ICD-10-CM

## 2023-10-05 LAB
ALBUMIN SERPL-MCNC: 4.3 G/DL
ALP BLD-CCNC: 98 U/L
ALT SERPL-CCNC: 36 U/L
ANION GAP SERPL CALCULATED.3IONS-SCNC: 14 MMOL/L
AST SERPL-CCNC: 43 U/L
BASOPHILS ABSOLUTE: 0.02 /ΜL
BASOPHILS RELATIVE PERCENT: 0.3 %
BILIRUB SERPL-MCNC: 0.5 MG/DL (ref 0.1–1.4)
BUN BLDV-MCNC: 9 MG/DL
CALCIUM SERPL-MCNC: 9.4 MG/DL
CHLORIDE BLD-SCNC: 103 MMOL/L
CO2: 27 MMOL/L
CREAT SERPL-MCNC: 0.7 MG/DL
EGFR: 91
EOSINOPHILS ABSOLUTE: 0.18 /ΜL
EOSINOPHILS RELATIVE PERCENT: 2.9 %
GLUCOSE BLD-MCNC: 96 MG/DL
HCT VFR BLD CALC: 37.6 % (ref 36–46)
HEMOGLOBIN: 12.2 G/DL (ref 12–16)
LYMPHOCYTES ABSOLUTE: 2.27 /ΜL
LYMPHOCYTES RELATIVE PERCENT: 36.4 %
MCH RBC QN AUTO: 28.6 PG
MCHC RBC AUTO-ENTMCNC: 32.4 G/DL
MCV RBC AUTO: 88.1 FL
MONOCYTES ABSOLUTE: 0.56 /ΜL
MONOCYTES RELATIVE PERCENT: 9 %
NEUTROPHILS ABSOLUTE: 3.19 /ΜL
NEUTROPHILS RELATIVE PERCENT: 51.1 %
PDW BLD-RTO: 43.1 %
PLATELET # BLD: 238 K/ΜL
PMV BLD AUTO: 10.6 FL
POTASSIUM SERPL-SCNC: 4.2 MMOL/L
RBC # BLD: 4.27 10^6/ΜL
SEDIMENTATION RATE, ERYTHROCYTE: 23
SODIUM BLD-SCNC: 139 MMOL/L
TOTAL PROTEIN: 6.9
WBC # BLD: 6.24 10^3/ML

## 2023-10-05 RX ORDER — LEFLUNOMIDE 20 MG/1
40 TABLET ORAL WEEKLY
Qty: 8 TABLET | Refills: 0 | Status: SHIPPED | OUTPATIENT
Start: 2023-10-05

## 2023-10-24 ENCOUNTER — OFFICE VISIT (OUTPATIENT)
Dept: RHEUMATOLOGY | Age: 58
End: 2023-10-24
Payer: COMMERCIAL

## 2023-10-24 VITALS
HEIGHT: 62 IN | BODY MASS INDEX: 38.57 KG/M2 | OXYGEN SATURATION: 99 % | HEART RATE: 80 BPM | SYSTOLIC BLOOD PRESSURE: 132 MMHG | DIASTOLIC BLOOD PRESSURE: 80 MMHG | WEIGHT: 209.6 LBS

## 2023-10-24 DIAGNOSIS — M54.41 CHRONIC MIDLINE LOW BACK PAIN WITH BILATERAL SCIATICA: ICD-10-CM

## 2023-10-24 DIAGNOSIS — Z51.81 MEDICATION MONITORING ENCOUNTER: ICD-10-CM

## 2023-10-24 DIAGNOSIS — G89.29 CHRONIC MIDLINE LOW BACK PAIN WITH BILATERAL SCIATICA: ICD-10-CM

## 2023-10-24 DIAGNOSIS — M05.79 RHEUMATOID ARTHRITIS INVOLVING MULTIPLE SITES WITH POSITIVE RHEUMATOID FACTOR (HCC): Primary | ICD-10-CM

## 2023-10-24 DIAGNOSIS — M54.42 CHRONIC MIDLINE LOW BACK PAIN WITH BILATERAL SCIATICA: ICD-10-CM

## 2023-10-24 DIAGNOSIS — M79.7 FIBROMYALGIA: ICD-10-CM

## 2023-10-24 PROCEDURE — 99214 OFFICE O/P EST MOD 30 MIN: CPT | Performed by: NURSE PRACTITIONER

## 2023-10-24 ASSESSMENT — ENCOUNTER SYMPTOMS
NAUSEA: 0
ABDOMINAL PAIN: 0
TROUBLE SWALLOWING: 0
COUGH: 0
DIARRHEA: 0
CONSTIPATION: 0
EYE ITCHING: 0
BACK PAIN: 1
SHORTNESS OF BREATH: 1
EYE PAIN: 0

## 2023-10-24 NOTE — PROGRESS NOTES
medications on file       Thank you for allowing me to participate in the care of this patient. Please call if there are any questions.

## 2023-11-02 DIAGNOSIS — M05.79 RHEUMATOID ARTHRITIS INVOLVING MULTIPLE SITES WITH POSITIVE RHEUMATOID FACTOR (HCC): ICD-10-CM

## 2023-11-02 RX ORDER — ABATACEPT 125 MG/ML
INJECTION, SOLUTION SUBCUTANEOUS
Refills: 6 | OUTPATIENT
Start: 2023-11-02

## 2023-11-14 ENCOUNTER — TELEPHONE (OUTPATIENT)
Dept: RHEUMATOLOGY | Age: 58
End: 2023-11-14

## 2023-11-14 NOTE — TELEPHONE ENCOUNTER
Pt phoned into the office requesting labs be faxed to [facility]. Hep. B and TB orders have been faxed.

## 2023-11-20 LAB
ALBUMIN SERPL-MCNC: 4.7 G/DL
ALP BLD-CCNC: 111 U/L
ALT SERPL-CCNC: 39 U/L
ANION GAP SERPL CALCULATED.3IONS-SCNC: 16 MMOL/L
AST SERPL-CCNC: 41 U/L
BASOPHILS ABSOLUTE: 0.02 /ΜL
BASOPHILS RELATIVE PERCENT: 0.3 %
BILIRUB SERPL-MCNC: 0.4 MG/DL (ref 0.1–1.4)
BUN BLDV-MCNC: 9 MG/DL
C-REACTIVE PROTEIN: NORMAL
CALCIUM SERPL-MCNC: 9.3 MG/DL
CHLORIDE BLD-SCNC: 103 MMOL/L
CO2: 26 MMOL/L
CREAT SERPL-MCNC: 0.8 MG/DL
EGFR: 78
EOSINOPHILS ABSOLUTE: 0.18 /ΜL
EOSINOPHILS RELATIVE PERCENT: 2.4 %
GLUCOSE BLD-MCNC: 89 MG/DL
HCT VFR BLD CALC: 38.5 % (ref 36–46)
HEMOGLOBIN: 12.7 G/DL (ref 12–16)
LYMPHOCYTES ABSOLUTE: 2.74 /ΜL
LYMPHOCYTES RELATIVE PERCENT: 37 %
MCH RBC QN AUTO: 29 PG
MCHC RBC AUTO-ENTMCNC: 33 G/DL
MCV RBC AUTO: 87.9 FL
MONOCYTES ABSOLUTE: 0.61 /ΜL
MONOCYTES RELATIVE PERCENT: 8.2 %
NEUTROPHILS ABSOLUTE: 3.83 /ΜL
NEUTROPHILS RELATIVE PERCENT: 51.7 %
PDW BLD-RTO: 43.7 %
PLATELET # BLD: 276 K/ΜL
PMV BLD AUTO: 11.1 FL
POTASSIUM SERPL-SCNC: 3.9 MMOL/L
QUANTI TB GOLD PLUS: NEGATIVE
QUANTI TB1 MINUS NIL: 0.35
QUANTI TB2 MINUS NIL: 0.35
QUANTIFERON MITOGEN: NORMAL
QUANTIFERON NIL: NORMAL
RBC # BLD: 4.38 10^6/ΜL
SEDIMENTATION RATE, ERYTHROCYTE: 30
SODIUM BLD-SCNC: 141 MMOL/L
TOTAL PROTEIN: 7.6
WBC # BLD: 7.41 10^3/ML

## 2024-02-27 ENCOUNTER — OFFICE VISIT (OUTPATIENT)
Dept: RHEUMATOLOGY | Age: 59
End: 2024-02-27
Payer: COMMERCIAL

## 2024-02-27 VITALS
WEIGHT: 210 LBS | HEART RATE: 68 BPM | OXYGEN SATURATION: 99 % | BODY MASS INDEX: 38.64 KG/M2 | DIASTOLIC BLOOD PRESSURE: 84 MMHG | HEIGHT: 62 IN | SYSTOLIC BLOOD PRESSURE: 130 MMHG

## 2024-02-27 DIAGNOSIS — M79.7 FIBROMYALGIA: ICD-10-CM

## 2024-02-27 DIAGNOSIS — M05.79 RHEUMATOID ARTHRITIS INVOLVING MULTIPLE SITES WITH POSITIVE RHEUMATOID FACTOR (HCC): Primary | ICD-10-CM

## 2024-02-27 DIAGNOSIS — G89.29 CHRONIC MIDLINE LOW BACK PAIN WITH BILATERAL SCIATICA: ICD-10-CM

## 2024-02-27 DIAGNOSIS — M54.41 CHRONIC MIDLINE LOW BACK PAIN WITH BILATERAL SCIATICA: ICD-10-CM

## 2024-02-27 DIAGNOSIS — M54.42 CHRONIC MIDLINE LOW BACK PAIN WITH BILATERAL SCIATICA: ICD-10-CM

## 2024-02-27 DIAGNOSIS — Z51.81 MEDICATION MONITORING ENCOUNTER: ICD-10-CM

## 2024-02-27 DIAGNOSIS — M25.562 LEFT KNEE PAIN, UNSPECIFIED CHRONICITY: ICD-10-CM

## 2024-02-27 PROCEDURE — 99214 OFFICE O/P EST MOD 30 MIN: CPT | Performed by: INTERNAL MEDICINE

## 2024-02-27 RX ORDER — FOLIC ACID 1 MG/1
1 TABLET ORAL DAILY
Qty: 90 TABLET | Refills: 1 | Status: SHIPPED | OUTPATIENT
Start: 2024-02-27

## 2024-02-27 RX ORDER — METHOTREXATE 25 MG/ML
5 INJECTION, SOLUTION INTRA-ARTERIAL; INTRAMUSCULAR; INTRAVENOUS WEEKLY
Qty: 4 ML | Refills: 0 | Status: SHIPPED | OUTPATIENT
Start: 2024-02-27

## 2024-02-27 ASSESSMENT — ENCOUNTER SYMPTOMS
EYE PAIN: 0
ABDOMINAL PAIN: 1
SHORTNESS OF BREATH: 0
TROUBLE SWALLOWING: 0
BACK PAIN: 0
CONSTIPATION: 1
EYE ITCHING: 0
NAUSEA: 0
DIARRHEA: 1
COUGH: 1

## 2024-02-27 NOTE — PROGRESS NOTES
pain), enbrel (rash), remicaide (insurance issues), prednisone (sig relief), xeljanz (abd pain, GI upset), minocycline, humira (? Intolerance), methotrexate 10 mg weekly (LFT elevation), arava 40 mg weekly (fatigue)   - orencia 125 mg subcu weekly (sept- oct 2021, restarted 3/2022)    Sicca symptoms -    - secondary Sjogren.    - discussed use of biotene mouth wash, sugar free candies and adequate water intake, eye drops prn.     Chronic low back pain w/ bilateral radicular sx's - no active back pain   - MRI lumbar spine w/ degenerative arthritis    Fibromyalgia  - prior tx: gabapentin, flexeril   - cymbalta 30 mg daily (June 2019)    Knee pain - ? Pes anserine bursitis    - stretches and exercises provided.     Medication monitoring   - CBC, CMP, sed rate, CRP q 4 weeks x 3    - TB gold negative (11/20/2023)      No follow-ups on file.        Electronically signed by RACHEL COHEN DO on 2/27/2024 at 11:44 AM    New Prescriptions    No medications on file       Thank you for allowing me to participate in the care of this patient.   Please call if there are any questions.

## 2024-02-28 ENCOUNTER — TELEPHONE (OUTPATIENT)
Dept: RHEUMATOLOGY | Age: 59
End: 2024-02-28

## 2024-02-28 DIAGNOSIS — M05.79 RHEUMATOID ARTHRITIS INVOLVING MULTIPLE SITES WITH POSITIVE RHEUMATOID FACTOR (HCC): Primary | ICD-10-CM

## 2024-02-28 LAB
ALBUMIN SERPL-MCNC: 4.2 G/DL
ALP BLD-CCNC: 110 U/L
ALT SERPL-CCNC: 35 U/L
ANION GAP SERPL CALCULATED.3IONS-SCNC: 5 MMOL/L
AST SERPL-CCNC: 39 U/L
BASOPHILS ABSOLUTE: 0.02 /ΜL
BASOPHILS RELATIVE PERCENT: 0.3 %
BILIRUB SERPL-MCNC: 0.6 MG/DL (ref 0.1–1.4)
BUN BLDV-MCNC: 9 MG/DL
C-REACTIVE PROTEIN: 5
CALCIUM SERPL-MCNC: 9.3 MG/DL
CHLORIDE BLD-SCNC: 108 MMOL/L
CO2: 28 MMOL/L
CREAT SERPL-MCNC: 0.7 MG/DL
EGFR: 91
EOSINOPHILS ABSOLUTE: 0.14 /ΜL
EOSINOPHILS RELATIVE PERCENT: 2.4 %
GLUCOSE BLD-MCNC: 102 MG/DL
HCT VFR BLD CALC: 35.9 % (ref 36–46)
HEMOGLOBIN: 11.8 G/DL (ref 12–16)
LYMPHOCYTES ABSOLUTE: 1.92 /ΜL
LYMPHOCYTES RELATIVE PERCENT: 32.7 %
MCH RBC QN AUTO: 28.6 PG
MCHC RBC AUTO-ENTMCNC: 32.9 G/DL
MCV RBC AUTO: 87.1 FL
MONOCYTES ABSOLUTE: 0.58 /ΜL
MONOCYTES RELATIVE PERCENT: 9.9 %
NEUTROPHILS ABSOLUTE: 3.14 /ΜL
NEUTROPHILS RELATIVE PERCENT: 53.3 %
PDW BLD-RTO: 43.2 %
PLATELET # BLD: 235 K/ΜL
PMV BLD AUTO: 10.2 FL
POTASSIUM SERPL-SCNC: 3.8 MMOL/L
RBC # BLD: 4.12 10^6/ΜL
SEDIMENTATION RATE, ERYTHROCYTE: 25
SODIUM BLD-SCNC: 138 MMOL/L
TOTAL PROTEIN: 7
WBC # BLD: 5.88 10^3/ML

## 2024-02-28 RX ORDER — SYRINGE-NEEDLE,INSULIN,0.5 ML 27GX1/2"
1 SYRINGE, EMPTY DISPOSABLE MISCELLANEOUS DAILY
Qty: 100 EACH | Refills: 3 | Status: SHIPPED | OUTPATIENT
Start: 2024-02-28

## 2024-02-28 NOTE — TELEPHONE ENCOUNTER
Received fax from [facility] pharmacy stating the insulin syringe 1 ml 30Gx5/16\" is on backorder/unavailable. [facility] asking if ok for 31Gx5/16 syringe as they have this available. Please advise. Thank you.

## 2024-05-02 LAB
ALBUMIN SERPL-MCNC: 4.4 G/DL
ALP BLD-CCNC: 105 U/L
ALT SERPL-CCNC: 36 U/L
ANION GAP SERPL CALCULATED.3IONS-SCNC: 8 MMOL/L
AST SERPL-CCNC: 40 U/L
BASOPHILS ABSOLUTE: 0.01 /ΜL
BASOPHILS RELATIVE PERCENT: 0.2 %
BILIRUB SERPL-MCNC: 0.5 MG/DL (ref 0.1–1.4)
BUN BLDV-MCNC: 10 MG/DL
C-REACTIVE PROTEIN: 8.5
CALCIUM SERPL-MCNC: 9.7 MG/DL
CHLORIDE BLD-SCNC: 107 MMOL/L
CO2: 27 MMOL/L
CREAT SERPL-MCNC: 0.6 MG/DL
EGFR: 108
EOSINOPHILS ABSOLUTE: 0.14 /ΜL
EOSINOPHILS RELATIVE PERCENT: 2.2 %
GLUCOSE BLD-MCNC: 96 MG/DL
HCT VFR BLD CALC: 36.4 % (ref 36–46)
HEMOGLOBIN: 12.1 G/DL (ref 12–16)
LYMPHOCYTES ABSOLUTE: 2.24 /ΜL
LYMPHOCYTES RELATIVE PERCENT: 35 %
MCH RBC QN AUTO: 29.1 PG
MCHC RBC AUTO-ENTMCNC: 33.2 G/DL
MCV RBC AUTO: 87.5 FL
MONOCYTES ABSOLUTE: 0.57 /ΜL
MONOCYTES RELATIVE PERCENT: 8.9 %
NEUTROPHILS ABSOLUTE: 3.42 /ΜL
NEUTROPHILS RELATIVE PERCENT: 53.4 %
PDW BLD-RTO: 44.4 %
PLATELET # BLD: 250 K/ΜL
PMV BLD AUTO: 10.6 FL
POTASSIUM SERPL-SCNC: 3.7 MMOL/L
RBC # BLD: 4.16 10^6/ΜL
SEDIMENTATION RATE, ERYTHROCYTE: 26
SODIUM BLD-SCNC: 137 MMOL/L
TOTAL PROTEIN: 7.2
WBC # BLD: 6.4 10^3/ML

## 2024-06-03 LAB
ALBUMIN SERPL-MCNC: 4.4 G/DL
ALP BLD-CCNC: 103 U/L
ALT SERPL-CCNC: 41 U/L
ANION GAP SERPL CALCULATED.3IONS-SCNC: 8 MMOL/L
AST SERPL-CCNC: 40 U/L
BASOPHILS ABSOLUTE: 0.01 /ΜL
BASOPHILS RELATIVE PERCENT: 0.1 %
BILIRUB SERPL-MCNC: 0.4 MG/DL (ref 0.1–1.4)
BUN BLDV-MCNC: 11 MG/DL
C-REACTIVE PROTEIN: 5.6
CALCIUM SERPL-MCNC: 9.4 MG/DL
CHLORIDE BLD-SCNC: 109 MMOL/L
CO2: 26 MMOL/L
CREAT SERPL-MCNC: 0.6 MG/DL
EGFR: 108
EOSINOPHILS ABSOLUTE: 0.1 /ΜL
EOSINOPHILS RELATIVE PERCENT: 1.4 %
GLUCOSE BLD-MCNC: 94 MG/DL
HCT VFR BLD CALC: 37 % (ref 36–46)
HEMOGLOBIN: 12.2 G/DL (ref 12–16)
LYMPHOCYTES ABSOLUTE: 2.48 /ΜL
LYMPHOCYTES RELATIVE PERCENT: 33.9 %
MCH RBC QN AUTO: 29.3 PG
MCHC RBC AUTO-ENTMCNC: 33 G/DL
MCV RBC AUTO: 88.7 FL
MONOCYTES ABSOLUTE: 0.62 /ΜL
MONOCYTES RELATIVE PERCENT: 8.5 %
NEUTROPHILS ABSOLUTE: 4.08 /ΜL
NEUTROPHILS RELATIVE PERCENT: 55.8 %
PDW BLD-RTO: 45.1 %
PLATELET # BLD: 248 K/ΜL
PMV BLD AUTO: 10.9 FL
POTASSIUM SERPL-SCNC: 3.9 MMOL/L
RBC # BLD: 4.17 10^6/ΜL
SEDIMENTATION RATE, ERYTHROCYTE: 22
SODIUM BLD-SCNC: 140 MMOL/L
TOTAL PROTEIN: 7.2
WBC # BLD: 7.31 10^3/ML

## 2024-06-05 ENCOUNTER — TELEPHONE (OUTPATIENT)
Dept: RHEUMATOLOGY | Age: 59
End: 2024-06-05

## 2024-06-05 NOTE — TELEPHONE ENCOUNTER
----- Message from XIANG Greenfield CNP sent at 6/4/2024  4:07 PM EDT -----  One of the liver function tests is mildly elevated. Are you still taking the methotrexate?

## 2024-06-06 ENCOUNTER — OFFICE VISIT (OUTPATIENT)
Dept: RHEUMATOLOGY | Age: 59
End: 2024-06-06
Payer: COMMERCIAL

## 2024-06-06 VITALS
HEART RATE: 73 BPM | SYSTOLIC BLOOD PRESSURE: 142 MMHG | DIASTOLIC BLOOD PRESSURE: 84 MMHG | BODY MASS INDEX: 38.42 KG/M2 | HEIGHT: 62 IN | WEIGHT: 208.8 LBS | OXYGEN SATURATION: 99 %

## 2024-06-06 DIAGNOSIS — M54.41 CHRONIC MIDLINE LOW BACK PAIN WITH BILATERAL SCIATICA: ICD-10-CM

## 2024-06-06 DIAGNOSIS — M54.42 CHRONIC MIDLINE LOW BACK PAIN WITH BILATERAL SCIATICA: ICD-10-CM

## 2024-06-06 DIAGNOSIS — M05.79 RHEUMATOID ARTHRITIS INVOLVING MULTIPLE SITES WITH POSITIVE RHEUMATOID FACTOR (HCC): Primary | ICD-10-CM

## 2024-06-06 DIAGNOSIS — R79.89 LFT ELEVATION: ICD-10-CM

## 2024-06-06 DIAGNOSIS — G89.29 CHRONIC MIDLINE LOW BACK PAIN WITH BILATERAL SCIATICA: ICD-10-CM

## 2024-06-06 DIAGNOSIS — Z51.81 MEDICATION MONITORING ENCOUNTER: ICD-10-CM

## 2024-06-06 DIAGNOSIS — M79.7 FIBROMYALGIA: ICD-10-CM

## 2024-06-06 PROCEDURE — 99214 OFFICE O/P EST MOD 30 MIN: CPT | Performed by: NURSE PRACTITIONER

## 2024-06-06 RX ORDER — SULFASALAZINE 500 MG/1
TABLET ORAL
Qty: 120 TABLET | Refills: 0 | Status: SHIPPED | OUTPATIENT
Start: 2024-06-06

## 2024-06-06 RX ORDER — ABATACEPT 125 MG/ML
125 INJECTION, SOLUTION SUBCUTANEOUS
Qty: 4 EACH | Refills: 11 | Status: ACTIVE | OUTPATIENT
Start: 2024-06-06

## 2024-06-06 ASSESSMENT — ENCOUNTER SYMPTOMS
COUGH: 0
CONSTIPATION: 0
NAUSEA: 0
BACK PAIN: 0
EYE ITCHING: 0
ABDOMINAL PAIN: 0
SHORTNESS OF BREATH: 0
EYE PAIN: 0
DIARRHEA: 0
TROUBLE SWALLOWING: 0

## 2024-06-06 NOTE — PROGRESS NOTES
[facility] RHEUMATOLOGY FOLLOW UP NOTE       Date Of Service: 6/6/2024  Provider: XIANG Greenfield - CNP    Name: Flavia Bentley   MRN: [id number]    Chief Complaint(s)     Chief Complaint   Patient presents with    Follow-up     2-month follow-up for RA.        History of Present Illness (HPI)     Flavia Bentley  is a(n)58 y.o. female with a hx of fibromyalgia, osteoporosis, GERD, rheumatoid arthritis, h/o hemorrhoids, fatty liver, dry eyes, HTN here for the f/u evaluation of rheumatoid arthritis, fibromyalgia, chronic low back pain w/ radiculopathy.      Interval hx:    - methotrexate stopped about 2.5-3 weeks ago due to LFT elevation     pain affecting the right thumb, right wrist   Pain on a scale 0-10: 2/10  Type of pain: intermittent  Timing:none  Aggravating factors: left hip: standing, walking    Associated symptoms:  denies swelling/  Redness/ warmth, + AM stiffness lasting ~ 10 mins     REVIEW OF SYSTEMS: (ROS)    Review of Systems   Constitutional:  Positive for fatigue. Negative for fever and unexpected weight change.   HENT:  Negative for congestion and trouble swallowing.         Dry mouth   Eyes:  Negative for pain and itching.        Dry eyes   Respiratory:  Negative for cough and shortness of breath.    Cardiovascular:  Negative for chest pain and leg swelling.   Gastrointestinal:  Negative for abdominal pain, constipation, diarrhea and nausea.   Endocrine: Negative for cold intolerance and heat intolerance.   Genitourinary:  Negative for difficulty urinating, frequency and urgency.   Musculoskeletal:  Positive for arthralgias. Negative for back pain, joint swelling, myalgias and neck pain.   Skin:  Negative for rash.   Neurological:  Positive for weakness and numbness. Negative for dizziness.   Psychiatric/Behavioral:  Positive for sleep disturbance.        PAST MEDICAL HISTORY      Past Medical History:   Diagnosis Date    Fatty liver 2018    Fibromyalgia     GERD (gastroesophageal reflux

## 2024-07-03 RX ORDER — SULFASALAZINE 500 MG/1
TABLET ORAL
Qty: 360 TABLET | Refills: 1 | OUTPATIENT
Start: 2024-07-03

## 2024-07-16 LAB
ALBUMIN: 4.2 G/DL
ALP BLD-CCNC: 88 U/L
ALT SERPL-CCNC: 31 U/L
ANION GAP SERPL CALCULATED.3IONS-SCNC: 12 MMOL/L
AST SERPL-CCNC: 32 U/L
BASOPHILS ABSOLUTE: 0.02 /ΜL
BASOPHILS RELATIVE PERCENT: 0.3 %
BILIRUB SERPL-MCNC: 0.5 MG/DL (ref 0.1–1.4)
BUN BLDV-MCNC: 11 MG/DL
C-REACTIVE PROTEIN: 7
CALCIUM SERPL-MCNC: 95 MG/DL
CHLORIDE BLD-SCNC: 106 MMOL/L
CO2: 25 MMOL/L
CREAT SERPL-MCNC: 0.6 MG/DL
EOSINOPHILS ABSOLUTE: 0.2 /ΜL
EOSINOPHILS RELATIVE PERCENT: 3.1 %
GFR, ESTIMATED: 108
GLUCOSE BLD-MCNC: 107 MG/DL
HCT VFR BLD CALC: 35.7 % (ref 36–46)
HEMOGLOBIN: 11.9 G/DL (ref 12–16)
LYMPHOCYTES ABSOLUTE: 2.21 /ΜL
LYMPHOCYTES RELATIVE PERCENT: 34.5 %
MCH RBC QN AUTO: 29.1 PG
MCHC RBC AUTO-ENTMCNC: 33.3 G/DL
MCV RBC AUTO: 87.3 FL
MONOCYTES ABSOLUTE: 0.53 /ΜL
MONOCYTES RELATIVE PERCENT: 8.3 %
NEUTROPHILS ABSOLUTE: 3.41 /ΜL
NEUTROPHILS RELATIVE PERCENT: 53.3 %
PDW BLD-RTO: 43.5 %
PLATELET # BLD: 228 K/ΜL
PMV BLD AUTO: 10.8 FL
POTASSIUM SERPL-SCNC: 4 MMOL/L
RBC # BLD: 4.09 10^6/ΜL
SED RATE, AUTOMATED: 15
SODIUM BLD-SCNC: 139 MMOL/L
TOTAL PROTEIN: 7 G/DL (ref 6.4–8.2)
WBC # BLD: 6.4 10^3/ML

## 2024-07-23 RX ORDER — SULFASALAZINE 500 MG/1
1000 TABLET ORAL 2 TIMES DAILY
Qty: 120 TABLET | Refills: 0 | Status: SHIPPED | OUTPATIENT
Start: 2024-07-23

## 2024-09-23 ENCOUNTER — PATIENT MESSAGE (OUTPATIENT)
Dept: RHEUMATOLOGY | Age: 59
End: 2024-09-23

## 2024-09-24 LAB
ALBUMIN: 4.4 G/DL
ALP BLD-CCNC: 83 U/L
ALT SERPL-CCNC: 32 U/L
ANION GAP SERPL CALCULATED.3IONS-SCNC: 10 MMOL/L
AST SERPL-CCNC: 31 U/L
BASOPHILS ABSOLUTE: 0.02 /ΜL
BASOPHILS RELATIVE PERCENT: 0.3 %
BILIRUB SERPL-MCNC: 0.7 MG/DL (ref 0.1–1.4)
BUN BLDV-MCNC: 11 MG/DL
C-REACTIVE PROTEIN: 6.4
CALCIUM SERPL-MCNC: 10 MG/DL
CHLORIDE BLD-SCNC: 106 MMOL/L
CO2: 26 MMOL/L
CREAT SERPL-MCNC: 0.8 MG/DL
EOSINOPHILS ABSOLUTE: 0.13 /ΜL
EOSINOPHILS RELATIVE PERCENT: 2.1 %
GFR, ESTIMATED: 78
GLUCOSE BLD-MCNC: 105 MG/DL
HCT VFR BLD CALC: 36.3 % (ref 36–46)
HEMOGLOBIN: 12.4 G/DL (ref 12–16)
LYMPHOCYTES ABSOLUTE: 2.05 /ΜL
LYMPHOCYTES RELATIVE PERCENT: 33.2 %
MCH RBC QN AUTO: 29.2 PG
MCHC RBC AUTO-ENTMCNC: 34.2 G/DL
MCV RBC AUTO: 85.4 FL
MONOCYTES ABSOLUTE: 0.49 /ΜL
MONOCYTES RELATIVE PERCENT: 7.9 %
NEUTROPHILS ABSOLUTE: 3.48 /ΜL
NEUTROPHILS RELATIVE PERCENT: 56.3 %
PDW BLD-RTO: 42.2 %
PLATELET # BLD: 260 K/ΜL
PMV BLD AUTO: 10.7 FL
POTASSIUM SERPL-SCNC: 4 MMOL/L
RBC # BLD: 4.25 10^6/ΜL
SED RATE, AUTOMATED: 20
SODIUM BLD-SCNC: 137 MMOL/L
TOTAL PROTEIN: 7.4 G/DL (ref 6.4–8.2)
WBC # BLD: 6.18 10^3/ML

## 2024-10-02 ENCOUNTER — OFFICE VISIT (OUTPATIENT)
Dept: RHEUMATOLOGY | Age: 59
End: 2024-10-02
Payer: COMMERCIAL

## 2024-10-02 VITALS
HEIGHT: 62 IN | BODY MASS INDEX: 38.28 KG/M2 | SYSTOLIC BLOOD PRESSURE: 126 MMHG | HEART RATE: 70 BPM | WEIGHT: 208 LBS | OXYGEN SATURATION: 99 % | DIASTOLIC BLOOD PRESSURE: 82 MMHG

## 2024-10-02 DIAGNOSIS — M54.42 CHRONIC MIDLINE LOW BACK PAIN WITH BILATERAL SCIATICA: ICD-10-CM

## 2024-10-02 DIAGNOSIS — M05.79 RHEUMATOID ARTHRITIS INVOLVING MULTIPLE SITES WITH POSITIVE RHEUMATOID FACTOR (HCC): Primary | ICD-10-CM

## 2024-10-02 DIAGNOSIS — M79.7 FIBROMYALGIA: ICD-10-CM

## 2024-10-02 DIAGNOSIS — Z51.81 MEDICATION MONITORING ENCOUNTER: ICD-10-CM

## 2024-10-02 DIAGNOSIS — G89.29 CHRONIC MIDLINE LOW BACK PAIN WITH BILATERAL SCIATICA: ICD-10-CM

## 2024-10-02 DIAGNOSIS — M54.41 CHRONIC MIDLINE LOW BACK PAIN WITH BILATERAL SCIATICA: ICD-10-CM

## 2024-10-02 PROCEDURE — 99214 OFFICE O/P EST MOD 30 MIN: CPT | Performed by: NURSE PRACTITIONER

## 2024-10-02 RX ORDER — DULOXETIN HYDROCHLORIDE 30 MG/1
30 CAPSULE, DELAYED RELEASE ORAL DAILY
COMMUNITY
Start: 2024-08-26

## 2024-10-02 RX ORDER — AMOXICILLIN 250 MG
1 CAPSULE ORAL DAILY PRN
COMMUNITY
Start: 2024-09-06

## 2024-10-02 ASSESSMENT — ENCOUNTER SYMPTOMS
SHORTNESS OF BREATH: 0
CONSTIPATION: 0
DIARRHEA: 0
EYE PAIN: 0
ABDOMINAL PAIN: 0
TROUBLE SWALLOWING: 0
EYE ITCHING: 0
BACK PAIN: 0
NAUSEA: 0
COUGH: 0

## 2024-10-02 NOTE — PROGRESS NOTES
PHYSICAL EXAMINATION / OBJECTIVE   Objective:  /82 (Site: Left Upper Arm, Position: Sitting, Cuff Size: Large Adult)   Pulse 70   Ht 1.575 m (5' 2.01\")   Wt 94.3 kg (208 lb)   SpO2 99%   BMI 38.03 kg/m²     Physical Exam  Vitals reviewed.   Constitutional:       Appearance: She is well-developed.   Cardiovascular:      Rate and Rhythm: Normal rate and regular rhythm.   Pulmonary:      Effort: Pulmonary effort is normal.      Breath sounds: Normal breath sounds.   Musculoskeletal:      Cervical back: Normal range of motion and neck supple.   Skin:     General: Skin is warm and dry.      Findings: No rash.   Neurological:      Mental Status: She is alert and oriented to person, place, and time.   Psychiatric:         Thought Content: Thought content normal.       Upper extremities:    SHOULDERS nontender  ELBOWS tender bilat  WRISTS  nontender  HANDS/FINGERS nontender    Lower extremities:  HIPS tender bilat  KNEES nontender  ANKLES tender bilat  FEET : tender bilat MTPs     Spine:   Tender lumbosacral spine      RAPID 3:   10/2/2024 --- RAPID 3: 3.7 + 2 + 2.5 = 8.2    Remission: <3  Low Disease Activity: <6  Moderate Disease Activity: >=6 and <=12  High Disease Activity: >12     LABS    CBC  Lab Results   Component Value Date/Time    WBC 6.18 09/24/2024 11:44 AM    RBC 4.25 09/24/2024 11:44 AM    HGB 12.4 09/24/2024 11:44 AM    HCT 36.3 09/24/2024 11:44 AM    MCV 85.4 09/24/2024 11:44 AM    MCH 29.2 09/24/2024 11:44 AM    MCHC 34.2 09/24/2024 11:44 AM    RDW 42.2 09/24/2024 11:44 AM     09/24/2024 11:44 AM    MPV 10.7 09/24/2024 11:44 AM       CMP  Lab Results   Component Value Date/Time    CALCIUM 10.0 09/24/2024 11:44 AM     09/24/2024 11:44 AM    K 4.0 09/24/2024 11:44 AM    CO2 26 09/24/2024 11:44 AM     09/24/2024 11:44 AM    BUN 11 09/24/2024 11:44 AM    CREATININE 0.80 09/24/2024 11:44 AM    ALKPHOS 83 09/24/2024 11:44 AM    ALT 32 09/24/2024 11:44 AM    AST 31 09/24/2024

## 2024-10-10 ENCOUNTER — TELEPHONE (OUTPATIENT)
Dept: RHEUMATOLOGY | Age: 59
End: 2024-10-10

## 2024-10-10 NOTE — TELEPHONE ENCOUNTER
It looks like patient Giovanna is requiring prior authValerie Rudolph, are you able to help with this? Thank you.

## 2024-12-06 LAB
ALBUMIN: 4.4 G/DL
ALP BLD-CCNC: 102 U/L
ALT SERPL-CCNC: 35 U/L
ANION GAP SERPL CALCULATED.3IONS-SCNC: 13 MMOL/L
AST SERPL-CCNC: 37 U/L
BASOPHILS ABSOLUTE: 0.01 /ΜL
BASOPHILS RELATIVE PERCENT: 0.2 %
BILIRUB SERPL-MCNC: 0.7 MG/DL (ref 0.1–1.4)
BUN BLDV-MCNC: 8 MG/DL
C-REACTIVE PROTEIN: 5.6
CALCIUM SERPL-MCNC: 9.5 MG/DL
CHLORIDE BLD-SCNC: 102 MMOL/L
CO2: 27 MMOL/L
CREAT SERPL-MCNC: 0.8 MG/DL
EOSINOPHILS ABSOLUTE: 0.14 /ΜL
EOSINOPHILS RELATIVE PERCENT: 2.3 %
GFR, ESTIMATED: 78
GLUCOSE BLD-MCNC: 107 MG/DL
HCT VFR BLD CALC: 37.7 % (ref 36–46)
HEMOGLOBIN: 12.4 G/DL (ref 12–16)
LYMPHOCYTES ABSOLUTE: 1.92 /ΜL
LYMPHOCYTES RELATIVE PERCENT: 32.2 %
MCH RBC QN AUTO: 28.6 PG
MCHC RBC AUTO-ENTMCNC: 32.9 G/DL
MCV RBC AUTO: 87.1 FL
MONOCYTES ABSOLUTE: 0.57 /ΜL
MONOCYTES RELATIVE PERCENT: 9.5 %
NEUTROPHILS ABSOLUTE: 3.31 /ΜL
NEUTROPHILS RELATIVE PERCENT: 55.5 %
PDW BLD-RTO: 41.6 %
PLATELET # BLD: 235 K/ΜL
PMV BLD AUTO: 10.7 FL
POTASSIUM SERPL-SCNC: 3.7 MMOL/L
RBC # BLD: 4.33 10^6/ΜL
SED RATE, AUTOMATED: 14
SODIUM BLD-SCNC: 139 MMOL/L
TOTAL PROTEIN: 7.2 G/DL (ref 6.4–8.2)
WBC # BLD: 5.97 10^3/ML

## 2025-02-07 LAB
ALBUMIN: 4.7 G/DL
ALP BLD-CCNC: 114 U/L
ALT SERPL-CCNC: 31 U/L
ANION GAP SERPL CALCULATED.3IONS-SCNC: 12 MMOL/L
AST SERPL-CCNC: 44 U/L
BASOPHILS ABSOLUTE: 0.03 /ΜL
BASOPHILS RELATIVE PERCENT: 0.5 %
BILIRUB SERPL-MCNC: 0.8 MG/DL (ref 0.1–1.4)
BUN BLDV-MCNC: 9 MG/DL
C-REACTIVE PROTEIN: 6.7
CALCIUM SERPL-MCNC: 9.6 MG/DL
CHLORIDE BLD-SCNC: 102 MMOL/L
CO2: 28 MMOL/L
CREAT SERPL-MCNC: 0.8 MG/DL
EOSINOPHILS ABSOLUTE: 0.15 /ΜL
EOSINOPHILS RELATIVE PERCENT: 2.4 %
GFR, ESTIMATED: 78
GLUCOSE BLD-MCNC: 99 MG/DL
HCT VFR BLD CALC: 40.6 % (ref 36–46)
HEMOGLOBIN: 13.4 G/DL (ref 12–16)
LYMPHOCYTES ABSOLUTE: 2.24 /ΜL
LYMPHOCYTES RELATIVE PERCENT: 36.2 %
MCH RBC QN AUTO: 28.3 PG
MCHC RBC AUTO-ENTMCNC: 33 G/DL
MCV RBC AUTO: 85.7 FL
MONOCYTES ABSOLUTE: 0.53 /ΜL
MONOCYTES RELATIVE PERCENT: 8.6 %
NEUTROPHILS ABSOLUTE: 3.2 /ΜL
NEUTROPHILS RELATIVE PERCENT: 51.8 %
PDW BLD-RTO: 42.3 %
PLATELET # BLD: 271 K/ΜL
PMV BLD AUTO: 10.6 FL
POTASSIUM SERPL-SCNC: 4 MMOL/L
RBC # BLD: 4.74 10^6/ΜL
SED RATE, AUTOMATED: 24
SODIUM BLD-SCNC: 138 MMOL/L
TOTAL PROTEIN: 7.5 G/DL (ref 6.4–8.2)
WBC # BLD: 6.18 10^3/ML

## 2025-03-10 LAB
ALBUMIN: 4.5 G/DL
ALP BLD-CCNC: 135 U/L
ALT SERPL-CCNC: 59 U/L
ANION GAP SERPL CALCULATED.3IONS-SCNC: 14 MMOL/L
AST SERPL-CCNC: 75 U/L
BASOPHILS ABSOLUTE: 0.01 /ΜL
BASOPHILS RELATIVE PERCENT: 0.2 %
BILIRUB SERPL-MCNC: 0.7 MG/DL (ref 0.1–1.4)
BUN BLDV-MCNC: 8 MG/DL
C-REACTIVE PROTEIN: 12.4
CALCIUM SERPL-MCNC: 9.2 MG/DL
CHLORIDE BLD-SCNC: 102 MMOL/L
CO2: 25 MMOL/L
CREAT SERPL-MCNC: 0.6 MG/DL
EOSINOPHILS ABSOLUTE: 0.15 /ΜL
EOSINOPHILS RELATIVE PERCENT: 3.3 %
GFR, ESTIMATED: 108
GLUCOSE BLD-MCNC: 88 MG/DL
HCT VFR BLD CALC: 38.4 % (ref 36–46)
HEMOGLOBIN: 13.1 G/DL (ref 12–16)
LYMPHOCYTES ABSOLUTE: 1.78 /ΜL
LYMPHOCYTES RELATIVE PERCENT: 39.7 %
MCH RBC QN AUTO: 28.6 PG
MCHC RBC AUTO-ENTMCNC: 34.1 G/DL
MCV RBC AUTO: 83.8 FL
MONOCYTES ABSOLUTE: 0.54 /ΜL
MONOCYTES RELATIVE PERCENT: 12.1 %
NEUTROPHILS ABSOLUTE: 1.99 /ΜL
NEUTROPHILS RELATIVE PERCENT: 44.5 %
PDW BLD-RTO: 42.1 %
PLATELET # BLD: 222 K/ΜL
PMV BLD AUTO: 11.2 FL
POTASSIUM SERPL-SCNC: 3.9 MMOL/L
RBC # BLD: 4.58 10^6/ΜL
SED RATE, AUTOMATED: 36
SODIUM BLD-SCNC: 138 MMOL/L
TOTAL PROTEIN: 7.3 G/DL (ref 6.4–8.2)
VITAMIN D 25-HYDROXY: 66.1
VITAMIN D2, 25 HYDROXY: NORMAL
VITAMIN D3,25 HYDROXY: NORMAL
WBC # BLD: 4.48 10^3/ML

## 2025-03-11 ENCOUNTER — RESULTS FOLLOW-UP (OUTPATIENT)
Age: 60
End: 2025-03-11

## 2025-03-17 ENCOUNTER — HOSPITAL ENCOUNTER (OUTPATIENT)
Dept: GENERAL RADIOLOGY | Age: 60
Discharge: HOME | End: 2025-03-17
Payer: COMMERCIAL

## 2025-03-17 ENCOUNTER — OFFICE VISIT (OUTPATIENT)
Age: 60
End: 2025-03-17
Payer: COMMERCIAL

## 2025-03-17 ENCOUNTER — RESULTS FOLLOW-UP (OUTPATIENT)
Dept: GENERAL RADIOLOGY | Age: 60
End: 2025-03-17

## 2025-03-17 ENCOUNTER — HOSPITAL ENCOUNTER (OUTPATIENT)
Age: 60
Discharge: HOME | End: 2025-03-17
Payer: COMMERCIAL

## 2025-03-17 VITALS
HEIGHT: 62 IN | WEIGHT: 201.9 LBS | SYSTOLIC BLOOD PRESSURE: 124 MMHG | OXYGEN SATURATION: 99 % | DIASTOLIC BLOOD PRESSURE: 70 MMHG | BODY MASS INDEX: 37.15 KG/M2 | HEART RATE: 86 BPM

## 2025-03-17 DIAGNOSIS — M79.7 FIBROMYALGIA: Primary | ICD-10-CM

## 2025-03-17 DIAGNOSIS — M05.79 RHEUMATOID ARTHRITIS INVOLVING MULTIPLE SITES WITH POSITIVE RHEUMATOID FACTOR (HCC): ICD-10-CM

## 2025-03-17 DIAGNOSIS — G89.29 CHRONIC MIDLINE LOW BACK PAIN WITH BILATERAL SCIATICA: ICD-10-CM

## 2025-03-17 DIAGNOSIS — Z51.81 MEDICATION MONITORING ENCOUNTER: ICD-10-CM

## 2025-03-17 DIAGNOSIS — M54.42 CHRONIC MIDLINE LOW BACK PAIN WITH BILATERAL SCIATICA: ICD-10-CM

## 2025-03-17 DIAGNOSIS — M54.41 CHRONIC MIDLINE LOW BACK PAIN WITH BILATERAL SCIATICA: ICD-10-CM

## 2025-03-17 DIAGNOSIS — R06.09 DOE (DYSPNEA ON EXERTION): ICD-10-CM

## 2025-03-17 PROCEDURE — 71046 X-RAY EXAM CHEST 2 VIEWS: CPT

## 2025-03-17 PROCEDURE — 99214 OFFICE O/P EST MOD 30 MIN: CPT | Performed by: INTERNAL MEDICINE

## 2025-03-17 RX ORDER — ROSUVASTATIN CALCIUM 20 MG/1
20 TABLET, COATED ORAL DAILY
COMMUNITY
Start: 2025-03-10

## 2025-03-17 RX ORDER — LEFLUNOMIDE 20 MG/1
20 TABLET ORAL DAILY
Qty: 30 TABLET | Refills: 3 | Status: SHIPPED | OUTPATIENT
Start: 2025-03-17

## 2025-03-17 ASSESSMENT — ENCOUNTER SYMPTOMS
ABDOMINAL PAIN: 0
NAUSEA: 0
EYE PAIN: 0
EYE ITCHING: 0
DIARRHEA: 0
BACK PAIN: 0
COUGH: 0
SHORTNESS OF BREATH: 0
TROUBLE SWALLOWING: 0
CONSTIPATION: 0

## 2025-03-17 ASSESSMENT — JOINT PAIN
TOTAL NUMBER OF SWOLLEN JOINTS: 4
TOTAL NUMBER OF TENDER JOINTS: 6

## 2025-03-17 NOTE — PROGRESS NOTES
[facility] RHEUMATOLOGY FOLLOW UP NOTE       Date Of Service: 3/17/2025  Provider: RACHEL COHEN DO    Name: Flavia Bentley   MRN: [id number]    Chief Complaint(s)     Chief Complaint   Patient presents with    Follow-up     6-month f/u for RA.         History of Present Illness (HPI)     Flavia Bentley  is a(n)59 y.o. female with a hx of fibromyalgia, osteoporosis, GERD, rheumatoid arthritis, h/o hemorrhoids, fatty liver, dry eyes, HTN here for the f/u evaluation of rheumatoid arthritis, fibromyalgia, chronic low back pain w/ radiculopathy.        She reports pain in the neck, bilateral elbows and the right wrists - intermittent pain up to 4/10  Most severe elbow pain 2 months ago. , most severe in the evenings.   Aggravating factors: elbows - certain positions  Alleviating.   AM stiffness lasting ~ a few minutes     Extremely dry mouth , 2 teeth broken over the past couple years     Shortness of breath - intermittent - - lower sternal chest wall pain - s/p cardiology evaluation - for stress testing without abnoramlities. Awaiting adidtional testing.  + dependent lower extremity edema. Occasional orthopenia    Denies coughing, wheezing.          REVIEW OF SYSTEMS: (ROS)    Review of Systems   Constitutional:  Positive for fatigue. Negative for fever and unexpected weight change.   HENT:  Negative for congestion and trouble swallowing.         Dry mouth   Eyes:  Negative for pain and itching.        Dry eyes   Respiratory:  Negative for cough and shortness of breath.    Cardiovascular:  Negative for chest pain and leg swelling.   Gastrointestinal:  Negative for abdominal pain, constipation, diarrhea and nausea.   Endocrine: Negative for cold intolerance and heat intolerance.   Genitourinary:  Negative for difficulty urinating, frequency and urgency.   Musculoskeletal:  Positive for arthralgias. Negative for back pain, joint swelling, myalgias and neck pain.   Skin:  Negative for rash.   Neurological:  Positive for

## 2025-04-01 ENCOUNTER — RESULTS FOLLOW-UP (OUTPATIENT)
Age: 60
End: 2025-04-01

## 2025-04-01 ENCOUNTER — HOSPITAL ENCOUNTER (OUTPATIENT)
Dept: PULMONOLOGY | Age: 60
Discharge: HOME | End: 2025-04-01
Attending: INTERNAL MEDICINE
Payer: COMMERCIAL

## 2025-04-01 DIAGNOSIS — R06.09 DOE (DYSPNEA ON EXERTION): ICD-10-CM

## 2025-04-01 PROCEDURE — 94060 EVALUATION OF WHEEZING: CPT

## 2025-04-01 PROCEDURE — 94729 DIFFUSING CAPACITY: CPT

## 2025-04-01 PROCEDURE — 94726 PLETHYSMOGRAPHY LUNG VOLUMES: CPT

## 2025-05-19 LAB
ALBUMIN: 4.3 G/DL
ALP BLD-CCNC: 87 U/L
ALT SERPL-CCNC: 28 U/L
ANION GAP SERPL CALCULATED.3IONS-SCNC: -8 MMOL/L
AST SERPL-CCNC: 33 U/L
BASOPHILS ABSOLUTE: 0 /ΜL
BASOPHILS RELATIVE PERCENT: 0.4 %
BILIRUB SERPL-MCNC: 0.7 MG/DL (ref 0.1–1.4)
BUN BLDV-MCNC: 7 MG/DL
C-REACTIVE PROTEIN: 6.9
CALCIUM SERPL-MCNC: 9.3 MG/DL
CHLORIDE BLD-SCNC: 104 MMOL/L
CO2: 27 MMOL/L
CREAT SERPL-MCNC: 0.7 MG/DL
EOSINOPHILS ABSOLUTE: 0.1 /ΜL
EOSINOPHILS RELATIVE PERCENT: 2.6 %
GFR, ESTIMATED: 90
GLUCOSE BLD-MCNC: 99 MG/DL
HCT VFR BLD CALC: 36.8 % (ref 36–46)
HEMOGLOBIN: 12.4 G/DL (ref 12–16)
LYMPHOCYTES ABSOLUTE: 1.7 /ΜL
LYMPHOCYTES RELATIVE PERCENT: 31.7 %
MCH RBC QN AUTO: 29 PG
MCHC RBC AUTO-ENTMCNC: 33.7 G/DL
MCV RBC AUTO: 86.2 FL
MONOCYTES ABSOLUTE: 0.4 /ΜL
MONOCYTES RELATIVE PERCENT: 8.2 %
NEUTROPHILS ABSOLUTE: 3.1 /ΜL
NEUTROPHILS RELATIVE PERCENT: 56.7 %
PDW BLD-RTO: 40.8 %
PLATELET # BLD: 229 K/ΜL
PMV BLD AUTO: 10.9 FL
POTASSIUM SERPL-SCNC: 3.6 MMOL/L
RBC # BLD: 4.27 10^6/ΜL
SODIUM BLD-SCNC: 123 MMOL/L
TOTAL PROTEIN: 7 G/DL (ref 6.4–8.2)
WBC # BLD: 5.4 10^3/ML

## 2025-05-20 ENCOUNTER — RESULTS FOLLOW-UP (OUTPATIENT)
Age: 60
End: 2025-05-20

## 2025-05-20 ENCOUNTER — OFFICE VISIT (OUTPATIENT)
Age: 60
End: 2025-05-20
Payer: COMMERCIAL

## 2025-05-20 VITALS
HEIGHT: 62 IN | SYSTOLIC BLOOD PRESSURE: 122 MMHG | BODY MASS INDEX: 36.07 KG/M2 | OXYGEN SATURATION: 99 % | HEART RATE: 71 BPM | WEIGHT: 196 LBS | DIASTOLIC BLOOD PRESSURE: 76 MMHG

## 2025-05-20 DIAGNOSIS — M79.7 FIBROMYALGIA: ICD-10-CM

## 2025-05-20 DIAGNOSIS — M54.42 CHRONIC MIDLINE LOW BACK PAIN WITH BILATERAL SCIATICA: ICD-10-CM

## 2025-05-20 DIAGNOSIS — Z51.81 MEDICATION MONITORING ENCOUNTER: ICD-10-CM

## 2025-05-20 DIAGNOSIS — M05.79 RHEUMATOID ARTHRITIS INVOLVING MULTIPLE SITES WITH POSITIVE RHEUMATOID FACTOR (HCC): Primary | ICD-10-CM

## 2025-05-20 DIAGNOSIS — G89.29 CHRONIC MIDLINE LOW BACK PAIN WITH BILATERAL SCIATICA: ICD-10-CM

## 2025-05-20 DIAGNOSIS — M54.41 CHRONIC MIDLINE LOW BACK PAIN WITH BILATERAL SCIATICA: ICD-10-CM

## 2025-05-20 DIAGNOSIS — M05.79 RHEUMATOID ARTHRITIS INVOLVING MULTIPLE SITES WITH POSITIVE RHEUMATOID FACTOR (HCC): ICD-10-CM

## 2025-05-20 PROCEDURE — 99214 OFFICE O/P EST MOD 30 MIN: CPT | Performed by: NURSE PRACTITIONER

## 2025-05-20 ASSESSMENT — ENCOUNTER SYMPTOMS
GASTROINTESTINAL NEGATIVE: 1
EYES NEGATIVE: 1
SHORTNESS OF BREATH: 1

## 2025-05-20 NOTE — PROGRESS NOTES
[facility] RHEUMATOLOGY FOLLOW UP NOTE       Date Of Service: 2025  Provider: XIANG Greenfield - CNP    Name: Flavia Bentley   MRN: [id number]    Chief Complaint(s)     Chief Complaint   Patient presents with    Follow-up     2 month f/u RA          History of Present Illness (HPI)     Flavia Bentley  is a(n)59 y.o. female with a hx of fibromyalgia, osteoporosis, GERD, rheumatoid arthritis, h/o hemorrhoids, fatty liver, dry eyes, HTN here for the f/u evaluation of rheumatoid arthritis, fibromyalgia, chronic low back pain w/ radiculopathy.      Interval hx:    - did not start the arava prescribed at last visit- has been feeling good     pain affecting the wrists, left fingers   Pain on a scale 0-10: 1/10  Type of pain: intermittent  Timing:none  Aggravating factors: left hip: standing, walking    Associated symptoms:  denies swelling/  Redness/ warmth, denies AM stiffness     REVIEW OF SYSTEMS: (ROS)    Review of Systems   Constitutional: Negative.    HENT: Negative.     Eyes: Negative.    Respiratory:  Positive for shortness of breath (intermittent).    Cardiovascular: Negative.    Gastrointestinal: Negative.    Endocrine: Negative.    Genitourinary: Negative.    Musculoskeletal:  Positive for arthralgias.   Skin: Negative.    Neurological: Negative.    Psychiatric/Behavioral: Negative.         PAST MEDICAL HISTORY      Past Medical History:   Diagnosis Date    Fatty liver 2018    Fibromyalgia     GERD (gastroesophageal reflux disease)     Osteoporosis     Sleep apnea        PAST SURGICAL HISTORY      Past Surgical History:   Procedure Laterality Date    CATARACT REMOVAL  2014     SECTION  1985,1992,1988    CHOLECYSTECTOMY  2016    COLONOSCOPY      DILATION AND CURETTAGE OF UTERUS  1981    ESOPHAGOGASTRODUODENOSCOPY  2024    HYSTERECTOMY (CERVIX STATUS UNKNOWN)  2024    UPPER GASTROINTESTINAL ENDOSCOPY  2016       FAMILY HISTORY      Family History   Problem Relation Age of Onset

## 2025-05-22 DIAGNOSIS — M05.79 RHEUMATOID ARTHRITIS INVOLVING MULTIPLE SITES WITH POSITIVE RHEUMATOID FACTOR (HCC): ICD-10-CM

## 2025-05-22 RX ORDER — ABATACEPT 125 MG/ML
INJECTION, SOLUTION SUBCUTANEOUS
Qty: 4 EACH | Refills: 11 | Status: ACTIVE | OUTPATIENT
Start: 2025-05-22